# Patient Record
Sex: FEMALE | URBAN - METROPOLITAN AREA
[De-identification: names, ages, dates, MRNs, and addresses within clinical notes are randomized per-mention and may not be internally consistent; named-entity substitution may affect disease eponyms.]

---

## 2021-04-07 ENCOUNTER — HOSPITAL ENCOUNTER (EMERGENCY)
Facility: HOSPITAL | Age: 51
Discharge: STILL ADMITTED OR ADMINISTRATIVE | End: 2021-04-07

## 2021-04-07 RX ORDER — HYDROCODONE BITARTRATE AND ACETAMINOPHEN 5; 325 MG/1; MG/1
1 TABLET ORAL EVERY 6 HOURS PRN
COMMUNITY
End: 2021-04-07 | Stop reason: HOSPADM

## 2023-04-23 ENCOUNTER — APPOINTMENT (OUTPATIENT)
Dept: GENERAL RADIOLOGY | Facility: HOSPITAL | Age: 53
End: 2023-04-23
Payer: MEDICARE

## 2023-04-23 ENCOUNTER — HOSPITAL ENCOUNTER (EMERGENCY)
Facility: HOSPITAL | Age: 53
Discharge: HOME | End: 2023-04-23
Attending: EMERGENCY MEDICINE | Admitting: EMERGENCY MEDICINE
Payer: MEDICARE

## 2023-04-23 VITALS
DIASTOLIC BLOOD PRESSURE: 91 MMHG | HEIGHT: 67 IN | OXYGEN SATURATION: 100 % | TEMPERATURE: 97.9 F | WEIGHT: 293 LBS | BODY MASS INDEX: 45.99 KG/M2 | RESPIRATION RATE: 20 BRPM | HEART RATE: 64 BPM | SYSTOLIC BLOOD PRESSURE: 152 MMHG

## 2023-04-23 DIAGNOSIS — M94.0 COSTOCHONDRITIS, ACUTE: Primary | ICD-10-CM

## 2023-04-23 LAB
ALBUMIN SERPL-MCNC: 3.8 G/DL (ref 3.5–5.2)
ALBUMIN/GLOB SERPL: 1 G/DL
ALP SERPL-CCNC: 94 U/L (ref 39–117)
ALT SERPL W P-5'-P-CCNC: 25 U/L (ref 1–33)
ANION GAP SERPL CALCULATED.3IONS-SCNC: 10.4 MMOL/L (ref 5–15)
AST SERPL-CCNC: 27 U/L (ref 1–32)
BASOPHILS # BLD AUTO: 0.06 10*3/MM3 (ref 0–0.2)
BASOPHILS NFR BLD AUTO: 1 % (ref 0–1.5)
BILIRUB SERPL-MCNC: 0.3 MG/DL (ref 0–1.2)
BUN SERPL-MCNC: 9 MG/DL (ref 6–20)
BUN/CREAT SERPL: 12.5 (ref 7–25)
CALCIUM SPEC-SCNC: 8.9 MG/DL (ref 8.6–10.5)
CHLORIDE SERPL-SCNC: 103 MMOL/L (ref 98–107)
CO2 SERPL-SCNC: 25.6 MMOL/L (ref 22–29)
CREAT SERPL-MCNC: 0.72 MG/DL (ref 0.57–1)
DEPRECATED RDW RBC AUTO: 45.5 FL (ref 37–54)
EGFRCR SERPLBLD CKD-EPI 2021: 102 ML/MIN/1.73
EOSINOPHIL # BLD AUTO: 0.11 10*3/MM3 (ref 0–0.4)
EOSINOPHIL NFR BLD AUTO: 1.8 % (ref 0.3–6.2)
ERYTHROCYTE [DISTWIDTH] IN BLOOD BY AUTOMATED COUNT: 15.1 % (ref 12.3–15.4)
GEN 5 2HR TROPONIN T REFLEX: 11 NG/L
GLOBULIN UR ELPH-MCNC: 3.8 GM/DL
GLUCOSE SERPL-MCNC: 99 MG/DL (ref 65–99)
HCT VFR BLD AUTO: 38.5 % (ref 34–46.6)
HGB BLD-MCNC: 12.2 G/DL (ref 12–15.9)
HOLD SPECIMEN: NORMAL
HOLD SPECIMEN: NORMAL
IMM GRANULOCYTES # BLD AUTO: 0.02 10*3/MM3 (ref 0–0.05)
IMM GRANULOCYTES NFR BLD AUTO: 0.3 % (ref 0–0.5)
LIPASE SERPL-CCNC: 27 U/L (ref 13–60)
LYMPHOCYTES # BLD AUTO: 1.67 10*3/MM3 (ref 0.7–3.1)
LYMPHOCYTES NFR BLD AUTO: 26.8 % (ref 19.6–45.3)
MAGNESIUM SERPL-MCNC: 1.9 MG/DL (ref 1.6–2.6)
MCH RBC QN AUTO: 26.6 PG (ref 26.6–33)
MCHC RBC AUTO-ENTMCNC: 31.7 G/DL (ref 31.5–35.7)
MCV RBC AUTO: 84.1 FL (ref 79–97)
MONOCYTES # BLD AUTO: 0.47 10*3/MM3 (ref 0.1–0.9)
MONOCYTES NFR BLD AUTO: 7.5 % (ref 5–12)
NEUTROPHILS NFR BLD AUTO: 3.9 10*3/MM3 (ref 1.7–7)
NEUTROPHILS NFR BLD AUTO: 62.6 % (ref 42.7–76)
NRBC BLD AUTO-RTO: 0 /100 WBC (ref 0–0.2)
NT-PROBNP SERPL-MCNC: 169.9 PG/ML (ref 0–900)
PLATELET # BLD AUTO: 354 10*3/MM3 (ref 140–450)
PMV BLD AUTO: 10.1 FL (ref 6–12)
POTASSIUM SERPL-SCNC: 4.1 MMOL/L (ref 3.5–5.2)
PROT SERPL-MCNC: 7.6 G/DL (ref 6–8.5)
RBC # BLD AUTO: 4.58 10*6/MM3 (ref 3.77–5.28)
SODIUM SERPL-SCNC: 139 MMOL/L (ref 136–145)
TROPONIN T DELTA: 3 NG/L
TROPONIN T SERPL HS-MCNC: 8 NG/L
WBC NRBC COR # BLD: 6.23 10*3/MM3 (ref 3.4–10.8)
WHOLE BLOOD HOLD COAG: NORMAL
WHOLE BLOOD HOLD SPECIMEN: NORMAL

## 2023-04-23 PROCEDURE — 84484 ASSAY OF TROPONIN QUANT: CPT | Performed by: EMERGENCY MEDICINE

## 2023-04-23 PROCEDURE — 71045 X-RAY EXAM CHEST 1 VIEW: CPT

## 2023-04-23 PROCEDURE — 36415 COLL VENOUS BLD VENIPUNCTURE: CPT

## 2023-04-23 PROCEDURE — 80053 COMPREHEN METABOLIC PANEL: CPT | Performed by: EMERGENCY MEDICINE

## 2023-04-23 PROCEDURE — 83690 ASSAY OF LIPASE: CPT | Performed by: EMERGENCY MEDICINE

## 2023-04-23 PROCEDURE — 93005 ELECTROCARDIOGRAM TRACING: CPT | Performed by: EMERGENCY MEDICINE

## 2023-04-23 PROCEDURE — 99283 EMERGENCY DEPT VISIT LOW MDM: CPT

## 2023-04-23 PROCEDURE — 83735 ASSAY OF MAGNESIUM: CPT | Performed by: EMERGENCY MEDICINE

## 2023-04-23 PROCEDURE — 85025 COMPLETE CBC W/AUTO DIFF WBC: CPT | Performed by: EMERGENCY MEDICINE

## 2023-04-23 PROCEDURE — 83880 ASSAY OF NATRIURETIC PEPTIDE: CPT | Performed by: EMERGENCY MEDICINE

## 2023-04-23 RX ORDER — SODIUM CHLORIDE 0.9 % (FLUSH) 0.9 %
10 SYRINGE (ML) INJECTION AS NEEDED
Status: DISCONTINUED | OUTPATIENT
Start: 2023-04-23 | End: 2023-04-23 | Stop reason: HOSPADM

## 2023-04-23 RX ORDER — ASPIRIN 81 MG/1
324 TABLET, CHEWABLE ORAL ONCE
Status: DISCONTINUED | OUTPATIENT
Start: 2023-04-23 | End: 2023-04-23 | Stop reason: HOSPADM

## 2023-04-23 NOTE — ED PROVIDER NOTES
Time: 12:31 PM EDT  Date of encounter:  4/23/2023  Independent Historian/Clinical History and Information was obtained by:   Patient  Chief Complaint: Chest pain    History is limited by: N/A    History of Present Illness:  Patient is a 52 y.o. year old female who presents to the emergency department for evaluation of chest pain. Pt reports left sternal and mid scapula chest pain radiating to back and left shoulder. Pt reports the pain started 1030 to 1045 this morning. Pt reports the pain feels like an elephant sitting on her chest. Pt did not have pain last night. Pt reports she did have diaphoresis through the night. Pt reports breathing and movement makes the pain worse. Pt reports resting makes the pain better. Pt reports SOB due to the pain.  Pt denies fever. Pt reports pain induced coughing. Pt has a history of A-fib. Pt states she took 3 nitro prior to arrival without improvement of her pain.    HPI    Patient Care Team  Primary Care Provider: Georgiana Rey MD    Past Medical History:     Allergies   Allergen Reactions   • Bee Venom Anaphylaxis   • Neeta Anaphylaxis   • Iodinated Contrast Media Anaphylaxis and Itching   • Iodine Anaphylaxis   • Meloxicam Shortness Of Breath and Swelling   • Red Dye Shortness Of Breath   • Tartrazine Anaphylaxis   • Methocarbamol Nausea And Vomiting   • Oxycodone-Acetaminophen Itching and Swelling   • Famotidine Other (See Comments)     Pt experienced burning in vein and redness of arm above IV site   • Sumatriptan Swelling   • Tramadol Hcl Other (See Comments)     ULCERS IN MOUTH   • Adhesive Tape Rash     Past Medical History:   Diagnosis Date   • A-fib    • Asthma    • Diabetes mellitus    • Hypertension    • Lymphedema    • Migraine    • Murmur    • Sleep apnea      Past Surgical History:   Procedure Laterality Date   • CHOLECYSTECTOMY     • FOOT SURGERY     • HYSTERECTOMY     • INCONTINENCE SURGERY       History reviewed. No pertinent family history.    Home  "Medications:  Prior to Admission medications    Medication Sig Start Date End Date Taking? Authorizing Provider   diphenhydrAMINE (Benadryl Allergy) 25 MG tablet Take 1 tablet by mouth Every 6 (Six) Hours As Needed for Itching or Allergies (Take 4 times daily for next 4 days then as needed) for up to 30 doses. 4/7/21   Geremias Flores MD   esomeprazole (nexIUM) 40 MG capsule Take 40 mg by mouth Every Morning Before Breakfast.    ProviderErnestine MD   HYDROcodone-acetaminophen (NORCO) 7.5-325 MG per tablet Take 1 tablet by mouth Every 6 (Six) Hours As Needed for Moderate Pain .    Ernestine Jackson MD   metFORMIN ER (GLUCOPHAGE-XR) 500 MG 24 hr tablet Take 500 mg by mouth Daily With Breakfast.    ProviderErnestine MD   predniSONE (DELTASONE) 10 MG tablet 6 tabs by mouth day 1, 5 tabs by mouth day 2, continue tapering one tablet daily: Coarse 6 days. 4/7/21   Geremias Flores MD        Social History:   Social History     Tobacco Use   • Smoking status: Never   Vaping Use   • Vaping Use: Never used   Substance Use Topics   • Alcohol use: Never   • Drug use: Never         Review of Systems:  Review of Systems   Constitutional: Positive for diaphoresis. Negative for chills and fever.   HENT: Negative for congestion, ear pain and sore throat.    Eyes: Negative for pain.   Respiratory: Positive for cough and shortness of breath. Negative for chest tightness.    Cardiovascular: Positive for chest pain.   Gastrointestinal: Negative for abdominal pain, diarrhea, nausea and vomiting.   Genitourinary: Negative for flank pain and hematuria.   Musculoskeletal: Negative for joint swelling.   Skin: Negative for pallor.   Neurological: Negative for seizures and headaches.   All other systems reviewed and are negative.           Physical Exam:  /91   Pulse 64   Temp 97.9 °F (36.6 °C) (Oral)   Resp 20   Ht 170.2 cm (67\")   Wt (!) 181 kg (398 lb 2.4 oz)   SpO2 100%   BMI 62.36 kg/m²     Physical " Exam  Vitals and nursing note reviewed.   Constitutional:       General: She is not in acute distress.     Appearance: Normal appearance. She is morbidly obese. She is not toxic-appearing.   HENT:      Head: Normocephalic and atraumatic.      Mouth/Throat:      Mouth: Mucous membranes are moist.   Eyes:      General: No scleral icterus.  Cardiovascular:      Rate and Rhythm: Normal rate and regular rhythm.      Pulses: Normal pulses.      Heart sounds: Normal heart sounds.   Pulmonary:      Effort: Pulmonary effort is normal. No respiratory distress.      Breath sounds: Normal breath sounds.   Chest:      Chest wall: Tenderness (Pt does have reproducible tenderness of the left sternal border. ) present.   Abdominal:      General: Abdomen is flat.      Palpations: Abdomen is soft.      Tenderness: There is no abdominal tenderness.   Musculoskeletal:         General: Normal range of motion.      Cervical back: Normal range of motion and neck supple.      Thoracic back: Tenderness (Reproducible tenderness along the left middle parathoracic muscle area.) present.   Skin:     General: Skin is warm and dry.   Neurological:      Mental Status: She is alert and oriented to person, place, and time. Mental status is at baseline.                    Procedures:  Procedures      Medical Decision Making:      Comorbidities that affect care:    Heart Murmur, Hypertension, Sleep Apnea, Diabetes, Asthma, Atrial Fibrillation, morbid obesity.    External Notes reviewed:    Previous Clinic Note: Chart review shows no recent charts to review.  Her last office visit that could be reviewed and was reviewed by me was on 7/11/2014.      The following orders were placed and all results were independently analyzed by me:  Orders Placed This Encounter   Procedures   • XR Chest 1 View   • [place] Draw   • High Sensitivity Troponin T   • Comprehensive Metabolic Panel   • Lipase   • BNP   • Magnesium   • CBC Auto Differential   • High Sensitivity  Troponin T 2Hr   • NPO Diet NPO Type: Strict NPO   • Undress & Gown   • Cardiac Monitoring   • Continuous Pulse Oximetry   • Oxygen Therapy- Nasal Cannula; 2 LPM; Titrate for SPO2: equal to or greater than, 92%   • ECG 12 Lead ED Triage Standing Order; Chest Pain   • ECG 12 Lead ED Triage Standing Order; Chest Pain   • Insert Peripheral IV   • CBC & Differential   • Green Top (Gel)   • Lavender Top   • Gold Top - SST   • Light Blue Top       Medications Given in the Emergency Department:  Medications   sodium chloride 0.9 % flush 10 mL (has no administration in time range)   aspirin chewable tablet 324 mg (324 mg Oral Not Given 4/23/23 1200)        ED Course:    ED Course as of 04/23/23 1546   Sun Apr 23, 2023   1540 EKG performed at 1158 was interpreted by me to show normal sinus rhythm with a ventricular rate of 69 bpm.  The parable is 174 ms.  P waves are normal.  QRS interval is normal.  Axis XIX degrees.  There is no acute ischemic ST or T wave change identified.  QT corrected was 439 ms [TB]      ED Course User Index  [TB] Hugh Short DO     The patient was seen and evaluated the ED by me.  The above history and physical examination was performed as document.  Diagnostic data was obtained.  Results reviewed.  Discussed with the patient.  Patient's cardiac and pulmonary work-ups were unremarkable.  Patient's pain was reproducible and consistent with her symptoms of a more of a musculoskeletal etiology.  Patient reports that she has hydrocodone at home.  Patient states she does not need any kind of pain medicine from me.  Subsequently, patient for discharge home with outpatient treatment follow-up.  Patient also reports she has a primary care follow-up this week as well.      Labs:    Lab Results (last 24 hours)     Procedure Component Value Units Date/Time    High Sensitivity Troponin T [[id number]]  (Normal) Collected: 04/23/23 1210    Specimen: Blood Updated: 04/23/23 1306     HS Troponin T 8 ng/L      Narrative:      High Sensitive Troponin T Reference Range:  <10.0 ng/L- Negative Female for AMI  <15.0 ng/L- Negative Male for AMI  >=10 - Abnormal Female indicating possible myocardial injury.  >=15 - Abnormal Male indicating possible myocardial injury.   Clinicians would have to utilize clinical acumen, EKG, Troponin, and serial changes to determine if it is an Acute Myocardial Infarction or myocardial injury due to an underlying chronic condition.         CBC & Differential [[id number]]  (Normal) Collected: 04/23/23 1210    Specimen: Blood Updated: 04/23/23 1244    Narrative:      The following orders were created for panel order CBC & Differential.  Procedure                               Abnormality         Status                     ---------                               -----------         ------                     CBC Auto Differential[[id number]]        Normal              Final result                 Please view results for these tests on the individual orders.    Comprehensive Metabolic Panel [[id number]] Collected: 04/23/23 1210    Specimen: Blood Updated: 04/23/23 1306     Glucose 99 mg/dL      BUN 9 mg/dL      Creatinine 0.72 mg/dL      Sodium 139 mmol/L      Potassium 4.1 mmol/L      Chloride 103 mmol/L      CO2 25.6 mmol/L      Calcium 8.9 mg/dL      Total Protein 7.6 g/dL      Albumin 3.8 g/dL      ALT (SGPT) 25 U/L      AST (SGOT) 27 U/L      Alkaline Phosphatase 94 U/L      Total Bilirubin 0.3 mg/dL      Globulin 3.8 gm/dL      A/G Ratio 1.0 g/dL      BUN/Creatinine Ratio 12.5     Anion Gap 10.4 mmol/L      eGFR 102.0 mL/min/1.73     Narrative:      GFR Normal >60  Chronic Kidney Disease <60  Kidney Failure <15      Lipase [[id number]]  (Normal) Collected: 04/23/23 1210    Specimen: Blood Updated: 04/23/23 1306     Lipase 27 U/L     BNP [[id number]]  (Normal) Collected: 04/23/23 1210    Specimen: Blood Updated: 04/23/23 1302     proBNP 169.9 pg/mL     Narrative:      Among patients with dyspnea,  NT-proBNP is highly sensitive for the detection of acute congestive heart failure. In addition NT-proBNP of <300 pg/ml effectively rules out acute congestive heart failure with 99% negative predictive value.      Magnesium [[id number]]  (Normal) Collected: 04/23/23 1210    Specimen: Blood Updated: 04/23/23 1306     Magnesium 1.9 mg/dL     CBC Auto Differential [[id number]]  (Normal) Collected: 04/23/23 1210    Specimen: Blood Updated: 04/23/23 1244     WBC 6.23 10*3/mm3      RBC 4.58 10*6/mm3      Hemoglobin 12.2 g/dL      Hematocrit 38.5 %      MCV 84.1 fL      MCH 26.6 pg      MCHC 31.7 g/dL      RDW 15.1 %      RDW-SD 45.5 fl      MPV 10.1 fL      Platelets 354 10*3/mm3      Neutrophil % 62.6 %      Lymphocyte % 26.8 %      Monocyte % 7.5 %      Eosinophil % 1.8 %      Basophil % 1.0 %      Immature Grans % 0.3 %      Neutrophils, Absolute 3.90 10*3/mm3      Lymphocytes, Absolute 1.67 10*3/mm3      Monocytes, Absolute 0.47 10*3/mm3      Eosinophils, Absolute 0.11 10*3/mm3      Basophils, Absolute 0.06 10*3/mm3      Immature Grans, Absolute 0.02 10*3/mm3      nRBC 0.0 /100 WBC     High Sensitivity Troponin T 2Hr [[id number]]  (Abnormal) Collected: 04/23/23 1435    Specimen: Blood Updated: 04/23/23 1501     HS Troponin T 11 ng/L      Troponin T Delta 3 ng/L     Narrative:      High Sensitive Troponin T Reference Range:  <10.0 ng/L- Negative Female for AMI  <15.0 ng/L- Negative Male for AMI  >=10 - Abnormal Female indicating possible myocardial injury.  >=15 - Abnormal Male indicating possible myocardial injury.   Clinicians would have to utilize clinical acumen, EKG, Troponin, and serial changes to determine if it is an Acute Myocardial Infarction or myocardial injury due to an underlying chronic condition.                Imaging:    XR Chest 1 View    Result Date: 4/23/2023  PROCEDURE: XR CHEST 1 VW  COMPARISON: None  INDICATIONS: Chest Pain Triage Protocol  FINDINGS:  There is no pneumothorax, pleural effusion or  focal airspace consolidation. The heart size and pulmonary vasculature appear within normal limits. There are no acute osseous abnormalities.       No acute cardiopulmonary abnormality.       GLEN GALLEGOS MD       Electronically Signed and Approved By: GLEN GALLEGOS MD on 4/23/2023 at 12:19                 Differential Diagnosis and Discussion:    Chest Pain:  Based on the patient's signs and symptoms, I considered aortic dissection, myocardial infaction, pulmonary embolism, cardiac tamponade, pericarditis, pneumothorax, musculoskeletal chest pain and other differential diagnosis as an etiology of the patient's chest pain.   Dyspnea: Differential diagnosis includes but is not limited to metabolic acidosis, neurological disorders, psychogenic, asthma, pneumothorax, upper airway obstruction, COPD, pneumonia, noncardiogenic pulmonary edema, interstitial lung disease, anemia, congestive heart failure, and pulmonary embolism    All labs were reviewed and interpreted by me.  All X-rays impressions were independently interpreted by me.  EKG was interpreted by me.    MDM         Patient Care Considerations:    ANTIBIOTICS: I considered prescribing antibiotics as an outpatient however There is no evidence of an acute bacterial infection.      Consultants/Shared Management Plan:    None    Social Determinants of Health:    Patient is independent, reliable, and has access to care.       Disposition and Care Coordination:    Discharged: I considered escalation of care by admitting this patient for observation, however the patient has improved and is suitable and  stable for discharge.    I have explained the patient´s condition, diagnoses and treatment plan based on the information available to me at this time. I have answered questions and addressed any concerns. The patient has a good  understanding of the patient´s diagnosis, condition, and treatment plan as can be expected at this point. The vital signs have been stable.  The patient´s condition is stable and appropriate for discharge from the emergency department.      The patient will pursue further outpatient evaluation with the primary care physician or other designated or consulting physician as outlined in the discharge instructions. They are agreeable to this plan of care and follow-up instructions have been explained in detail. The patient has received these instructions in written format and have expressed an understanding of the discharge instructions. The patient is aware that any significant change in condition or worsening of symptoms should prompt an immediate return to this or the closest emergency department or call to 911.       Medication List      No changes were made to your prescriptions during this visit.      Georgiana Rey MD  [id number] [address]  [phone]      Follow-up this week as scheduled.       Final diagnoses:   Costochondritis, acute        ED Disposition     ED Disposition   Discharge    Condition   Stable    Comment   --             This medical record created using voice recognition software.        Documentation assistance provided by Som Reyna acting as scribe for Hugh Short DO. Information recorded by the scribe was done at my direction and has been verified and validated by me.          Som Reyna  04/23/23 1250       Hugh Short DO  04/23/23 9929

## 2023-04-23 NOTE — ED NOTES
Pt arrived via stretcher and EMS, was placed in a bed and triage began, patient stated the need to urinate and ambulated to the bathroom. EKG was delayed R/T this

## 2023-04-23 NOTE — DISCHARGE INSTRUCTIONS
Avoid any strenuous activities.  Continue with your current home medications as previously instructed.  Follow-up your family doctor this week as scheduled.  Return to the ER for any change or worsening symptoms or any other concerns that may arise.

## 2023-04-27 LAB — QT INTERVAL: 410 MS

## 2023-08-09 ENCOUNTER — TRANSCRIBE ORDERS (OUTPATIENT)
Dept: ADMINISTRATIVE | Facility: HOSPITAL | Age: 53
End: 2023-08-09
Payer: MEDICARE

## 2023-08-09 DIAGNOSIS — M79.89 SWELLING OF ARM: Primary | ICD-10-CM

## 2023-08-11 ENCOUNTER — HOSPITAL ENCOUNTER (OUTPATIENT)
Dept: CARDIOLOGY | Facility: HOSPITAL | Age: 53
Discharge: HOME | End: 2023-08-11
Payer: MEDICARE

## 2023-08-11 DIAGNOSIS — M79.89 SWELLING OF ARM: ICD-10-CM

## 2023-08-11 LAB
BH CV UPPER VENOUS LEFT AXILLARY AUGMENT: NORMAL
BH CV UPPER VENOUS LEFT AXILLARY COMPRESS: NORMAL
BH CV UPPER VENOUS LEFT AXILLARY PHASIC: NORMAL
BH CV UPPER VENOUS LEFT AXILLARY SPONT: NORMAL
BH CV UPPER VENOUS LEFT BASILIC FOREARM COMPRESS: NORMAL
BH CV UPPER VENOUS LEFT BASILIC UPPER COMPRESS: NORMAL
BH CV UPPER VENOUS LEFT BRACHIAL COMPRESS: NORMAL
BH CV UPPER VENOUS LEFT CEPHALIC FOREARM COMPRESS: NORMAL
BH CV UPPER VENOUS LEFT CEPHALIC UPPER COMPRESS: NORMAL
BH CV UPPER VENOUS LEFT INTERNAL JUGULAR AUGMENT: NORMAL
BH CV UPPER VENOUS LEFT INTERNAL JUGULAR COMPRESS: NORMAL
BH CV UPPER VENOUS LEFT INTERNAL JUGULAR PHASIC: NORMAL
BH CV UPPER VENOUS LEFT INTERNAL JUGULAR SPONT: NORMAL
BH CV UPPER VENOUS LEFT RADIAL COMPRESS: NORMAL
BH CV UPPER VENOUS LEFT SUBCLAVIAN AUGMENT: NORMAL
BH CV UPPER VENOUS LEFT SUBCLAVIAN COMPRESS: NORMAL
BH CV UPPER VENOUS LEFT SUBCLAVIAN PHASIC: NORMAL
BH CV UPPER VENOUS LEFT SUBCLAVIAN SPONT: NORMAL
BH CV UPPER VENOUS LEFT ULNAR COMPRESS: NORMAL
BH CV UPPER VENOUS RIGHT INTERNAL JUGULAR AUGMENT: NORMAL
BH CV UPPER VENOUS RIGHT INTERNAL JUGULAR COMPRESS: NORMAL
BH CV UPPER VENOUS RIGHT INTERNAL JUGULAR PHASIC: NORMAL
BH CV UPPER VENOUS RIGHT INTERNAL JUGULAR SPONT: NORMAL
BH CV UPPER VENOUS RIGHT SUBCLAVIAN AUGMENT: NORMAL
BH CV UPPER VENOUS RIGHT SUBCLAVIAN COMPRESS: NORMAL
BH CV UPPER VENOUS RIGHT SUBCLAVIAN PHASIC: NORMAL
BH CV UPPER VENOUS RIGHT SUBCLAVIAN SPONT: NORMAL

## 2023-08-11 PROCEDURE — 93971 EXTREMITY STUDY: CPT

## 2023-09-26 ENCOUNTER — OFFICE VISIT (OUTPATIENT)
Dept: ORTHOPEDIC SURGERY | Facility: CLINIC | Age: 53
End: 2023-09-26
Payer: MEDICARE

## 2023-09-26 VITALS — WEIGHT: 293 LBS | OXYGEN SATURATION: 97 % | HEIGHT: 67 IN | HEART RATE: 71 BPM | BODY MASS INDEX: 45.99 KG/M2

## 2023-09-26 DIAGNOSIS — M25.562 LEFT KNEE PAIN, UNSPECIFIED CHRONICITY: ICD-10-CM

## 2023-09-26 DIAGNOSIS — M25.561 RIGHT KNEE PAIN, UNSPECIFIED CHRONICITY: Primary | ICD-10-CM

## 2023-09-26 DIAGNOSIS — M17.0 BILATERAL PRIMARY OSTEOARTHRITIS OF KNEE: ICD-10-CM

## 2023-09-26 RX ORDER — LIDOCAINE HYDROCHLORIDE 10 MG/ML
5 INJECTION, SOLUTION INFILTRATION; PERINEURAL
Status: COMPLETED | OUTPATIENT
Start: 2023-09-26 | End: 2023-09-26

## 2023-09-26 RX ORDER — DICLOFENAC SODIUM 75 MG/1
75 TABLET, DELAYED RELEASE ORAL 2 TIMES DAILY
Qty: 60 TABLET | Refills: 2 | Status: SHIPPED | OUTPATIENT
Start: 2023-09-26

## 2023-09-26 RX ORDER — TRIAMCINOLONE ACETONIDE 40 MG/ML
40 INJECTION, SUSPENSION INTRA-ARTICULAR; INTRAMUSCULAR
Status: COMPLETED | OUTPATIENT
Start: 2023-09-26 | End: 2023-09-26

## 2023-09-26 RX ADMIN — LIDOCAINE HYDROCHLORIDE 5 ML: 10 INJECTION, SOLUTION INFILTRATION; PERINEURAL at 10:54

## 2023-09-26 RX ADMIN — LIDOCAINE HYDROCHLORIDE 5 ML: 10 INJECTION, SOLUTION INFILTRATION; PERINEURAL at 10:53

## 2023-09-26 RX ADMIN — TRIAMCINOLONE ACETONIDE 40 MG: 40 INJECTION, SUSPENSION INTRA-ARTICULAR; INTRAMUSCULAR at 10:53

## 2023-09-26 RX ADMIN — TRIAMCINOLONE ACETONIDE 40 MG: 40 INJECTION, SUSPENSION INTRA-ARTICULAR; INTRAMUSCULAR at 10:54

## 2023-09-26 NOTE — PROGRESS NOTES
"Chief Complaint  Initial Evaluation and Pain of the Left Knee and Pain and Initial Evaluation of the Right Knee     Subjective      Mina Rivera presents to [facility] for evaluation of the bilateral knees. The patient reports bilateral knee pain. She reports she has had multiple injured. She takes Norco. She reports swelling and lipoidemia .  She reports her left is worse than the right. She reports pain with stairs.     Allergies   Allergen Reactions    Bee Venom Anaphylaxis    Neeta Anaphylaxis    Iodinated Contrast Media Anaphylaxis and Itching    Iodine Anaphylaxis    Meloxicam Shortness Of Breath and Swelling    Red Dye Shortness Of Breath    Tartrazine Anaphylaxis    Methocarbamol Nausea And Vomiting    Oxycodone-Acetaminophen Itching and Swelling    Famotidine Other (See Comments)     Pt experienced burning in vein and redness of arm above IV site    Sumatriptan Swelling    Tramadol Hcl Other (See Comments)     ULCERS IN MOUTH    Adhesive Tape Rash        Social History     Socioeconomic History    Marital status: Single   Tobacco Use    Smoking status: Never   Vaping Use    Vaping Use: Never used   Substance and Sexual Activity    Alcohol use: Never    Drug use: Never        I reviewed the patient's chief complaint, history of present illness, review of systems, past medical history, surgical history, family history, social history, medications, and allergy list.     Review of Systems     Constitutional: Denies fevers, chills, weight loss  Cardiovascular: Denies chest pain, shortness of breath  Skin: Denies rashes, acute skin changes  Neurologic: Denies headache, loss of consciousness  MSK: bilateral knee pain      Vital Signs:   Pulse 71   Ht 170.2 cm (67\")   Wt (!) 181 kg (400 lb)   SpO2 97%   BMI 62.65 kg/m²          Physical Exam  General: Alert. No acute distress    Ortho Exam      Right knee- well healed scope scars. ROM -5 to 100 degrees. Medial joint line " tenderness. Stable to varus/valgus stress. Stable to anterior/posterior drawer. Negative Lachman's. Negative Silas's. Positive EHL, FHL, GS and TA. Sensation intact to all 5 nerves of the foot. Positive pulses.     Left knee- ROM -5 to 105 degrees. Medial joint line tenderness. Stable to varus/valgus stress. Stable to anterior/posterior drawer. Negative Lachman's. Negative Silas's. Positive EHL, FHL, GS and TA. Sensation intact to all 5 nerves of the foot. Positive pulses.     Right knee: R knee  Date/Time: 9/26/2023 10:53 AM  Consent given by: patient  Site marked: site marked  Timeout: Immediately prior to procedure a time out was called to verify the correct patient, procedure, equipment, support staff and site/side marked as required   Supporting Documentation  Indications: pain   Procedure Details  Location: knee - R knee  Needle gauge: 21G.  Medications administered: 5 mL lidocaine 1 %; 40 mg triamcinolone acetonide 40 MG/ML  Patient tolerance: patient tolerated the procedure well with no immediate complications      Left knee: L knee  Date/Time: 9/26/2023 10:54 AM  Consent given by: patient  Site marked: site marked  Timeout: Immediately prior to procedure a time out was called to verify the correct patient, procedure, equipment, support staff and site/side marked as required   Supporting Documentation  Indications: pain   Procedure Details  Location: knee - L knee  Needle gauge: 21G.  Medications administered: 5 mL lidocaine 1 %; 40 mg triamcinolone acetonide 40 MG/ML  Patient tolerance: patient tolerated the procedure well with no immediate complications        X-Ray Report:  Left knee X-Ray  Indication: Evaluation of left knee pain  AP/Lateral, Standing, and [place] view(s)  Findings: tricompartmental  advanced degenerative changes. Near bone on bone to the medial compartment. No acute fracture.   Prior studies available for comparison: no     X-Ray Report:  Right knee X-Ray  Indication: Evaluation  of right knee pain  AP/Lateral, Standing, and [place] view(s)  Findings: tricompartmental  advanced degenerative changes. Near bone on bone to the medial compartment. No acute fracture.   Prior studies available for comparison: no       Imaging Results (Most Recent)       Procedure Component Value Units Date/Time    XR Knee 3 View Right [[id number]] Resulted: 09/26/23 1016     Updated: 09/26/23 1018    XR Knee 3 View Left [[id number]] Resulted: 09/26/23 1016     Updated: 09/26/23 1018             Result Review :       No results found.           Assessment and Plan     Diagnoses and all orders for this visit:    1. Right knee pain, unspecified chronicity (Primary)  -     XR Knee 3 View Right    2. Left knee pain, unspecified chronicity  -     XR Knee 3 View Left    3. Bilateral primary osteoarthritis of knee        Discussed the treatment plan with the patient.  I reviewed the x-rays that were obtained today with the patient. Plan for conservative treatment at this time. Prescription for diclofenac given today. Discussed the risks and benefits of bilateral knee steroid injection. The patient expressed understanding and wished to proceed. She tolerated the injection.     Educated on risk of elevated BMI.  Discussed options for weight loss/decreasing BMI prior to procedure including dietician consult, weight loss options and exercise program. and Call or return if worsening symptoms.    Follow Up     6 weeks      Patient was given instructions and counseling regarding her condition or for health maintenance advice. Please see specific information pulled into the AVS if appropriate.     Scribed for Ben Herzog MD by Vicki López.  09/26/23   10:13 EDT    I have personally performed the services described in this document as scribed by the above individual and it is both accurate and complete. Ben Herzog MD 09/26/23

## 2023-11-25 DIAGNOSIS — M25.562 LEFT KNEE PAIN, UNSPECIFIED CHRONICITY: ICD-10-CM

## 2023-11-25 DIAGNOSIS — M25.561 RIGHT KNEE PAIN, UNSPECIFIED CHRONICITY: ICD-10-CM

## 2023-11-25 DIAGNOSIS — M17.0 BILATERAL PRIMARY OSTEOARTHRITIS OF KNEE: ICD-10-CM

## 2023-11-27 RX ORDER — DICLOFENAC SODIUM 75 MG/1
75 TABLET, DELAYED RELEASE ORAL 2 TIMES DAILY
Qty: 60 TABLET | Refills: 2 | Status: SHIPPED | OUTPATIENT
Start: 2023-11-27

## 2023-12-28 ENCOUNTER — OFFICE VISIT (OUTPATIENT)
Dept: ORTHOPEDIC SURGERY | Facility: CLINIC | Age: 53
End: 2023-12-28
Payer: MEDICARE

## 2023-12-28 VITALS
WEIGHT: 293 LBS | DIASTOLIC BLOOD PRESSURE: 93 MMHG | HEART RATE: 85 BPM | HEIGHT: 67 IN | OXYGEN SATURATION: 97 % | SYSTOLIC BLOOD PRESSURE: 180 MMHG | BODY MASS INDEX: 45.99 KG/M2

## 2023-12-28 DIAGNOSIS — M17.0 BILATERAL PRIMARY OSTEOARTHRITIS OF KNEE: ICD-10-CM

## 2023-12-28 DIAGNOSIS — M25.561 RIGHT KNEE PAIN, UNSPECIFIED CHRONICITY: Primary | ICD-10-CM

## 2023-12-28 DIAGNOSIS — M25.562 LEFT KNEE PAIN, UNSPECIFIED CHRONICITY: ICD-10-CM

## 2023-12-28 RX ORDER — ALBUTEROL SULFATE 90 UG/1
AEROSOL, METERED RESPIRATORY (INHALATION)
COMMUNITY

## 2023-12-28 RX ORDER — TRIAMCINOLONE ACETONIDE 40 MG/ML
40 INJECTION, SUSPENSION INTRA-ARTICULAR; INTRAMUSCULAR
Status: COMPLETED | OUTPATIENT
Start: 2023-12-28 | End: 2023-12-28

## 2023-12-28 RX ORDER — HYDROCODONE BITARTRATE AND ACETAMINOPHEN 5; 325 MG/1; MG/1
1 TABLET ORAL EVERY 6 HOURS
COMMUNITY
Start: 2023-12-05

## 2023-12-28 RX ORDER — LIDOCAINE HYDROCHLORIDE 10 MG/ML
5 INJECTION, SOLUTION INFILTRATION; PERINEURAL
Status: COMPLETED | OUTPATIENT
Start: 2023-12-28 | End: 2023-12-28

## 2023-12-28 RX ORDER — CETIRIZINE HYDROCHLORIDE 10 MG/1
10 TABLET ORAL DAILY
COMMUNITY
Start: 2023-08-15 | End: 2024-08-14

## 2023-12-28 RX ORDER — BLOOD-GLUCOSE METER
KIT MISCELLANEOUS DAILY
COMMUNITY
Start: 2023-09-19

## 2023-12-28 RX ORDER — VORTIOXETINE 20 MG/1
1 TABLET, FILM COATED ORAL DAILY
COMMUNITY

## 2023-12-28 RX ADMIN — TRIAMCINOLONE ACETONIDE 40 MG: 40 INJECTION, SUSPENSION INTRA-ARTICULAR; INTRAMUSCULAR at 13:02

## 2023-12-28 RX ADMIN — LIDOCAINE HYDROCHLORIDE 5 ML: 10 INJECTION, SOLUTION INFILTRATION; PERINEURAL at 13:02

## 2023-12-28 NOTE — PROGRESS NOTES
Chief Complaint  Pain and Follow-up of the Left Knee and Pain and Follow-up of the Right Knee    Subjective          Pain        Mina Rivera is a 53 y.o. female  presents to [facility] for     Patient presents for follow-up evaluation of bilateral knee pain, bilateral knee osteoarthritis.  She saw Dr. Herzog originally on 9/26/2023 for initial evaluation he gave her steroid injections which she states helped until last week.  She states her left is worse than her right.  She admits to pain going up and down stairs she has had injuries in the past to her knees.  She is requesting bilateral steroid injections    Allergies   Allergen Reactions    Bee Venom Anaphylaxis    Fd&C Yellow #5 (Tartrazine) Anaphylaxis     Patient states that she tolerates Keflex and Diflucan well by taking over-the-counter Benadryl along with the medications.    Neeta Anaphylaxis and Other (See Comments)    Iodinated Contrast Media Anaphylaxis, Itching and Other (See Comments)    Iodine Anaphylaxis    Meloxicam Shortness Of Breath and Swelling    Red Dye Shortness Of Breath    Methocarbamol Nausea And Vomiting    Oxycodone-Acetaminophen Itching, Swelling and Other (See Comments)    Famotidine Other (See Comments)     Pt experienced burning in vein and redness of arm above IV site    Food Color Red Other (See Comments)    Shellfish Allergy Other (See Comments)    Sumatriptan Swelling    Tramadol Other (See Comments)    Tramadol Hcl Other (See Comments)     ULCERS IN MOUTH    Adhesive Tape Rash        Social History     Socioeconomic History    Marital status:    Tobacco Use    Smoking status: Never    Smokeless tobacco: Never   Vaping Use    Vaping Use: Never used   Substance and Sexual Activity    Alcohol use: Never    Drug use: Never        REVIEW OF SYSTEMS    Constitutional: Awake alert and oriented x3, no acute distress, denies fevers, chills, weight loss  Respiratory: No respiratory  "distress  Vascular: Brisk cap refill, Intact distal pulses, No cyanosis, compartments soft with no signs or symptoms of compartment syndrome or DVT.   Cardiovascular: Denies chest pain, shortness of breath  Skin: Denies rashes, acute skin changes  Neurologic: Denies headache, loss of consciousness  MSK: Bilateral knee pain      Objective   Vital Signs:   /93   Pulse 85   Ht 170.2 cm (67\")   Wt (!) 181 kg (399 lb 0.5 oz)   SpO2 97%   BMI 62.50 kg/m²     Body mass index is 62.5 kg/m².    Physical Exam       Right knee- well healed scope scars. ROM -5 to 100 degrees. Medial joint line tenderness. Stable to varus/valgus stress. Stable to anterior/posterior drawer. Negative Lachman's. Negative Silas's. Positive EHL, FHL, GS and TA. Sensation intact to all 5 nerves of the foot. Positive pulses.      Left knee- ROM -5 to 105 degrees. Medial joint line tenderness. Stable to varus/valgus stress. Stable to anterior/posterior drawer. Negative Lachman's. Negative Silas's. Positive EHL, FHL, GS and TA. Sensation intact to all 5 nerves of the foot. Positive pulses.       Large Joint Arthrocentesis: R knee  Date/Time: 12/28/2023 1:02 PM  Consent given by: patient  Site marked: site marked  Timeout: Immediately prior to procedure a time out was called to verify the correct patient, procedure, equipment, support staff and site/side marked as required   Supporting Documentation  Indications: pain   Procedure Details  Location: knee - R knee  Preparation: Patient was prepped and draped in the usual sterile fashion  Needle gauge: 21 G.  Approach: lateral  Medications administered: 5 mL lidocaine 1 %; 40 mg triamcinolone acetonide 40 MG/ML  Patient tolerance: patient tolerated the procedure well with no immediate complications      Large Joint Arthrocentesis: L knee  Date/Time: 12/28/2023 1:02 PM  Consent given by: patient  Site marked: site marked  Timeout: Immediately prior to procedure a time out was called to verify " the correct patient, procedure, equipment, support staff and site/side marked as required   Supporting Documentation  Indications: pain   Procedure Details  Location: knee - L knee  Preparation: Patient was prepped and draped in the usual sterile fashion  Needle gauge: 21 G.  Approach: lateral  Medications administered: 5 mL lidocaine 1 %; 40 mg triamcinolone acetonide 40 MG/ML  Patient tolerance: patient tolerated the procedure well with no immediate complications        Imaging Results (Most Recent)       None             Result Review :   The following data was reviewed by: EVY Dacosta on 12/28/2023:               Assessment and Plan    Diagnoses and all orders for this visit:    1. Right knee pain, unspecified chronicity (Primary)    2. Left knee pain, unspecified chronicity    3. Bilateral primary osteoarthritis of knee    Other orders  -     Large Joint Arthrocentesis: R knee  -     Large Joint Arthrocentesis: L knee        Discussed diagnosis and treatment options with the patient she elected to have bilateral knee steroid injections which she tolerated well follow-up in 3 months    Call or return if worsening symptoms.    Follow Up   Return in about 3 months (around 3/28/2024) for Recheck.  Patient was given instructions and counseling regarding her condition or for health maintenance advice. Please see specific information pulled into the AVS if appropriate.       EMR Dragon/Transcription disclaimer:  Much of this encounter note is an electronic transcription/translation of spoken language to printed text, aka voice recognition.  The electronic translation of spoken language may permit erroneous or at times nonsensical words or phrases to be inadvertently transcribed; although I have reviewed the note for such errors, some may still exist so please interpret based on surrounding text content.

## 2024-01-13 ENCOUNTER — APPOINTMENT (OUTPATIENT)
Dept: GENERAL RADIOLOGY | Facility: HOSPITAL | Age: 54
End: 2024-01-13
Payer: MEDICARE

## 2024-01-13 ENCOUNTER — HOSPITAL ENCOUNTER (EMERGENCY)
Facility: HOSPITAL | Age: 54
Discharge: HOME | End: 2024-01-13
Attending: EMERGENCY MEDICINE
Payer: MEDICARE

## 2024-01-13 VITALS
OXYGEN SATURATION: 95 % | BODY MASS INDEX: 45.99 KG/M2 | WEIGHT: 293 LBS | TEMPERATURE: 101.6 F | RESPIRATION RATE: 18 BRPM | DIASTOLIC BLOOD PRESSURE: 83 MMHG | SYSTOLIC BLOOD PRESSURE: 152 MMHG | HEART RATE: 80 BPM | HEIGHT: 67 IN

## 2024-01-13 DIAGNOSIS — J10.1 INFLUENZA A: Primary | ICD-10-CM

## 2024-01-13 LAB
ALBUMIN SERPL-MCNC: 3.5 G/DL (ref 3.5–5.2)
ALBUMIN/GLOB SERPL: 0.9 G/DL
ALP SERPL-CCNC: 102 U/L (ref 39–117)
ALT SERPL W P-5'-P-CCNC: 17 U/L (ref 1–33)
ANION GAP SERPL CALCULATED.3IONS-SCNC: 11.7 MMOL/L (ref 5–15)
AST SERPL-CCNC: 18 U/L (ref 1–32)
BASOPHILS # BLD AUTO: 0.03 10*3/MM3 (ref 0–0.2)
BASOPHILS NFR BLD AUTO: 0.5 % (ref 0–1.5)
BILIRUB SERPL-MCNC: 0.6 MG/DL (ref 0–1.2)
BUN SERPL-MCNC: 9 MG/DL (ref 6–20)
BUN/CREAT SERPL: 8.7 (ref 7–25)
CALCIUM SPEC-SCNC: 9 MG/DL (ref 8.6–10.5)
CHLORIDE SERPL-SCNC: 99 MMOL/L (ref 98–107)
CO2 SERPL-SCNC: 24.3 MMOL/L (ref 22–29)
CREAT SERPL-MCNC: 1.03 MG/DL (ref 0.57–1)
D-LACTATE SERPL-SCNC: 1.2 MMOL/L (ref 0.5–2)
DEPRECATED RDW RBC AUTO: 45.6 FL (ref 37–54)
EGFRCR SERPLBLD CKD-EPI 2021: 66 ML/MIN/1.73
EOSINOPHIL # BLD AUTO: 0.01 10*3/MM3 (ref 0–0.4)
EOSINOPHIL NFR BLD AUTO: 0.2 % (ref 0.3–6.2)
ERYTHROCYTE [DISTWIDTH] IN BLOOD BY AUTOMATED COUNT: 14.5 % (ref 12.3–15.4)
FLUAV SUBTYP SPEC NAA+PROBE: DETECTED
FLUBV RNA ISLT QL NAA+PROBE: NOT DETECTED
GLOBULIN UR ELPH-MCNC: 4 GM/DL
GLUCOSE SERPL-MCNC: 111 MG/DL (ref 65–99)
HCT VFR BLD AUTO: 41 % (ref 34–46.6)
HGB BLD-MCNC: 13 G/DL (ref 12–15.9)
HOLD SPECIMEN: NORMAL
HOLD SPECIMEN: NORMAL
IMM GRANULOCYTES # BLD AUTO: 0.05 10*3/MM3 (ref 0–0.05)
IMM GRANULOCYTES NFR BLD AUTO: 0.9 % (ref 0–0.5)
LYMPHOCYTES # BLD AUTO: 0.51 10*3/MM3 (ref 0.7–3.1)
LYMPHOCYTES NFR BLD AUTO: 8.8 % (ref 19.6–45.3)
MCH RBC QN AUTO: 27.3 PG (ref 26.6–33)
MCHC RBC AUTO-ENTMCNC: 31.7 G/DL (ref 31.5–35.7)
MCV RBC AUTO: 86 FL (ref 79–97)
MONOCYTES # BLD AUTO: 0.68 10*3/MM3 (ref 0.1–0.9)
MONOCYTES NFR BLD AUTO: 11.7 % (ref 5–12)
NEUTROPHILS NFR BLD AUTO: 4.53 10*3/MM3 (ref 1.7–7)
NEUTROPHILS NFR BLD AUTO: 77.9 % (ref 42.7–76)
NRBC BLD AUTO-RTO: 0 /100 WBC (ref 0–0.2)
PLATELET # BLD AUTO: 332 10*3/MM3 (ref 140–450)
PMV BLD AUTO: 9.9 FL (ref 6–12)
POTASSIUM SERPL-SCNC: 4 MMOL/L (ref 3.5–5.2)
PROT SERPL-MCNC: 7.5 G/DL (ref 6–8.5)
RBC # BLD AUTO: 4.77 10*6/MM3 (ref 3.77–5.28)
RSV RNA NPH QL NAA+NON-PROBE: NOT DETECTED
SARS-COV-2 RNA RESP QL NAA+PROBE: NOT DETECTED
SODIUM SERPL-SCNC: 135 MMOL/L (ref 136–145)
WBC NRBC COR # BLD AUTO: 5.81 10*3/MM3 (ref 3.4–10.8)
WHOLE BLOOD HOLD COAG: NORMAL
WHOLE BLOOD HOLD SPECIMEN: NORMAL

## 2024-01-13 PROCEDURE — 25810000003 SODIUM CHLORIDE 0.9 % SOLUTION: Performed by: EMERGENCY MEDICINE

## 2024-01-13 PROCEDURE — 96374 THER/PROPH/DIAG INJ IV PUSH: CPT

## 2024-01-13 PROCEDURE — 85025 COMPLETE CBC W/AUTO DIFF WBC: CPT | Performed by: EMERGENCY MEDICINE

## 2024-01-13 PROCEDURE — 87637 SARSCOV2&INF A&B&RSV AMP PRB: CPT | Performed by: EMERGENCY MEDICINE

## 2024-01-13 PROCEDURE — 87040 BLOOD CULTURE FOR BACTERIA: CPT | Performed by: EMERGENCY MEDICINE

## 2024-01-13 PROCEDURE — 25010000002 ONDANSETRON PER 1 MG: Performed by: EMERGENCY MEDICINE

## 2024-01-13 PROCEDURE — 36415 COLL VENOUS BLD VENIPUNCTURE: CPT

## 2024-01-13 PROCEDURE — 83605 ASSAY OF LACTIC ACID: CPT | Performed by: EMERGENCY MEDICINE

## 2024-01-13 PROCEDURE — 71045 X-RAY EXAM CHEST 1 VIEW: CPT

## 2024-01-13 PROCEDURE — 80053 COMPREHEN METABOLIC PANEL: CPT | Performed by: EMERGENCY MEDICINE

## 2024-01-13 PROCEDURE — 99283 EMERGENCY DEPT VISIT LOW MDM: CPT

## 2024-01-13 RX ORDER — ONDANSETRON 4 MG/1
8 TABLET, ORALLY DISINTEGRATING ORAL EVERY 8 HOURS PRN
Qty: 20 TABLET | Refills: 0 | Status: SHIPPED | OUTPATIENT
Start: 2024-01-13

## 2024-01-13 RX ORDER — SODIUM CHLORIDE 0.9 % (FLUSH) 0.9 %
10 SYRINGE (ML) INJECTION AS NEEDED
Status: DISCONTINUED | OUTPATIENT
Start: 2024-01-13 | End: 2024-01-14 | Stop reason: HOSPADM

## 2024-01-13 RX ORDER — ONDANSETRON 2 MG/ML
8 INJECTION INTRAMUSCULAR; INTRAVENOUS ONCE
Status: COMPLETED | OUTPATIENT
Start: 2024-01-13 | End: 2024-01-13

## 2024-01-13 RX ADMIN — SODIUM CHLORIDE 1000 ML: 9 INJECTION, SOLUTION INTRAVENOUS at 20:17

## 2024-01-13 RX ADMIN — ONDANSETRON 8 MG: 2 INJECTION INTRAMUSCULAR; INTRAVENOUS at 20:17

## 2024-01-14 NOTE — ED PROVIDER NOTES
Time: 8:01 PM EST  Date of encounter:  1/13/2024  Independent Historian/Clinical History and Information was obtained by:   Patient    History is limited by: N/A    Chief Complaint: Not feeling well      History of Present Illness:  Patient is a 53 y.o. year old female who presents to the emergency department for evaluation of not feeling well.  Patient reports multiple autoimmune diseases and complains of nausea, vomiting, diarrhea and cough causing a headache.  She reports difficulty holding down fluids or food for the past 2 to 3 days.    HPI    Patient Care Team  Primary Care Provider: Janki Clayton MD    Past Medical History:     Allergies   Allergen Reactions    Bee Venom Anaphylaxis    Fd&C Yellow #5 (Tartrazine) Anaphylaxis     Patient states that she tolerates Keflex and Diflucan well by taking over-the-counter Benadryl along with the medications.    Neeta Anaphylaxis and Other (See Comments)    Iodinated Contrast Media Anaphylaxis, Itching and Other (See Comments)    Iodine Anaphylaxis    Meloxicam Shortness Of Breath and Swelling    Red Dye Shortness Of Breath    Methocarbamol Nausea And Vomiting    Oxycodone-Acetaminophen Itching, Swelling and Other (See Comments)    Famotidine Other (See Comments)     Pt experienced burning in vein and redness of arm above IV site    Food Color Red Other (See Comments)    Shellfish Allergy Other (See Comments)    Sumatriptan Swelling    Tramadol Other (See Comments)    Tramadol Hcl Other (See Comments)     ULCERS IN MOUTH    Adhesive Tape Rash     Past Medical History:   Diagnosis Date    A-fib     Asthma     Diabetes mellitus     Hypertension     Lymphedema     Migraine     Murmur     Sleep apnea      Past Surgical History:   Procedure Laterality Date    CHOLECYSTECTOMY      FOOT SURGERY      HYSTERECTOMY      INCONTINENCE SURGERY       History reviewed. No pertinent family history.    Home Medications:  Prior to Admission medications    Medication Sig Start Date  End Date Taking? Authorizing Provider   albuterol sulfate  (90 Base) MCG/ACT inhaler INHALE 2 PUFFS INTO THE LUNGS BY MOUTH EVERY 6 HOURS AS NEEDED FOR WHEEZING    Ernestine Jackson MD   Blood Glucose Monitoring Suppl (OneTouch Verio Reflect) w/Device kit Daily. 9/19/23   Ernestine Jackson MD   cetirizine (zyrTEC) 10 MG tablet Take 1 tablet by mouth Daily. 8/15/23 8/14/24  Ernestine Jackson MD   diclofenac (VOLTAREN) 75 MG EC tablet TAKE 1 TABLET BY MOUTH TWICE DAILY 11/27/23   Ben Herzog MD   diphenhydrAMINE (Benadryl Allergy) 25 MG tablet Take 1 tablet by mouth Every 6 (Six) Hours As Needed for Itching or Allergies (Take 4 times daily for next 4 days then as needed) for up to 30 doses. 4/7/21   Germeias Flores MD   esomeprazole (nexIUM) 40 MG capsule Take 1 capsule by mouth Every Morning Before Breakfast.    Ernestine Jackson MD   HYDROcodone-acetaminophen (NORCO) 5-325 MG per tablet Take 1 tablet by mouth Every 6 (Six) Hours. 12/5/23   Ernestine Jackson MD   metFORMIN ER (GLUCOPHAGE-XR) 500 MG 24 hr tablet Take 1 tablet by mouth Daily With Breakfast.    Ernestine Jackson MD   Trintellix 20 MG tablet Take 1 tablet by mouth Daily.    Ernestine Jackson MD        Social History:   Social History     Tobacco Use    Smoking status: Never    Smokeless tobacco: Never   Vaping Use    Vaping Use: Never used   Substance Use Topics    Alcohol use: Never    Drug use: Never         Review of Systems:  Review of Systems   Constitutional:  Negative for chills and fever.   HENT:  Negative for congestion, rhinorrhea and sore throat.    Eyes:  Negative for pain and visual disturbance.   Respiratory:  Positive for cough. Negative for apnea, chest tightness and shortness of breath.    Cardiovascular:  Negative for chest pain and palpitations.   Gastrointestinal:  Positive for diarrhea, nausea and vomiting. Negative for abdominal pain.   Genitourinary:  Negative for difficulty urinating  "and dysuria.   Musculoskeletal:  Negative for joint swelling and myalgias.   Skin:  Negative for color change.   Neurological:  Negative for seizures and headaches.   Psychiatric/Behavioral: Negative.     All other systems reviewed and are negative.       Physical Exam:  /83   Pulse 80   Temp (!) 101.6 °F (38.7 °C) (Oral)   Resp 18   Ht 170.2 cm (67\")   Wt (!) 173 kg (380 lb 8.2 oz)   SpO2 95%   BMI 59.60 kg/m²     Physical Exam  Vitals and nursing note reviewed.   Constitutional:       General: She is not in acute distress.     Appearance: Normal appearance. She is not toxic-appearing.   HENT:      Head: Normocephalic and atraumatic.      Jaw: There is normal jaw occlusion.   Eyes:      General: Lids are normal.      Extraocular Movements: Extraocular movements intact.      Conjunctiva/sclera: Conjunctivae normal.      Pupils: Pupils are equal, round, and reactive to light.   Cardiovascular:      Rate and Rhythm: Normal rate and regular rhythm.      Pulses: Normal pulses.      Heart sounds: Normal heart sounds.   Pulmonary:      Effort: Pulmonary effort is normal. No respiratory distress.      Breath sounds: Normal breath sounds. No wheezing or rhonchi.   Abdominal:      General: Abdomen is flat.      Palpations: Abdomen is soft.      Tenderness: There is no abdominal tenderness. There is no guarding or rebound.   Musculoskeletal:         General: Normal range of motion.      Cervical back: Normal range of motion and neck supple.      Right lower leg: No edema.      Left lower leg: No edema.   Skin:     General: Skin is warm and dry.   Neurological:      Mental Status: She is alert and oriented to person, place, and time. Mental status is at baseline.   Psychiatric:         Mood and Affect: Mood normal.                  Procedures:  Procedures      Medical Decision Making:      Comorbidities that affect care:    Lupus, rheumatoid arthritis, migraine headaches    External Notes reviewed:    Previous " Clinic Note: Family medicine office visit for general medical management      The following orders were placed and all results were independently analyzed by me:  Orders Placed This Encounter   Procedures    Blood Culture - Blood,    Blood Culture - Blood,    COVID-19, FLU A/B, RSV PCR 1 HR TAT - Swab, Nasopharynx    XR Chest 1 View    Comprehensive Metabolic Panel    Lactic Acid, Plasma    [place] Draw    CBC Auto Differential    Undress & Gown    Continuous Pulse Oximetry    Vital Signs    Oxygen Therapy- Nasal Cannula; Titrate 1-6 LPM Per SpO2; 90 - 95%    Insert Peripheral IV    CBC & Differential    Green Top (Gel)    Lavender Top    Gold Top - SST    Light Blue Top       Medications Given in the Emergency Department:  Medications   sodium chloride 0.9 % flush 10 mL (has no administration in time range)   sodium chloride 0.9 % bolus 1,000 mL (1,000 mL Intravenous New Bag 1/13/24 2017)   ondansetron (ZOFRAN) injection 8 mg (8 mg Intravenous Given 1/13/24 2017)        ED Course:         Labs:    Lab Results (last 24 hours)       Procedure Component Value Units Date/Time    COVID-19, FLU A/B, RSV PCR 1 HR TAT - Swab, Nasopharynx [[id number]]  (Abnormal) Collected: 01/13/24 1900    Specimen: Swab from Nasopharynx Updated: 01/13/24 1955     COVID19 Not Detected     Influenza A PCR Detected     Influenza B PCR Not Detected     RSV, PCR Not Detected    Narrative:      Fact sheet for providers: https://www.fda.gov/media/206560/download    Fact sheet for patients: https://www.fda.gov/media/249479/download    Test performed by PCR.    CBC & Differential [[id number]]  (Abnormal) Collected: 01/13/24 1941    Specimen: Blood Updated: 01/13/24 1951    Narrative:      The following orders were created for panel order CBC & Differential.  Procedure                               Abnormality         Status                     ---------                               -----------         ------                     CBC Auto  Differential[[id number]]        Abnormal            Final result                 Please view results for these tests on the individual orders.    Comprehensive Metabolic Panel [[id number]]  (Abnormal) Collected: 01/13/24 1941    Specimen: Blood Updated: 01/13/24 2007     Glucose 111 mg/dL      BUN 9 mg/dL      Creatinine 1.03 mg/dL      Sodium 135 mmol/L      Potassium 4.0 mmol/L      Chloride 99 mmol/L      CO2 24.3 mmol/L      Calcium 9.0 mg/dL      Total Protein 7.5 g/dL      Albumin 3.5 g/dL      ALT (SGPT) 17 U/L      AST (SGOT) 18 U/L      Alkaline Phosphatase 102 U/L      Total Bilirubin 0.6 mg/dL      Globulin 4.0 gm/dL      A/G Ratio 0.9 g/dL      BUN/Creatinine Ratio 8.7     Anion Gap 11.7 mmol/L      eGFR 66.0 mL/min/1.73     Narrative:      GFR Normal >60  Chronic Kidney Disease <60  Kidney Failure <15      Lactic Acid, Plasma [[id number]]  (Normal) Collected: 01/13/24 1941    Specimen: Blood Updated: 01/13/24 2003     Lactate 1.2 mmol/L     Blood Culture - Blood, Arm, Left [[id number]] Collected: 01/13/24 1941    Specimen: Blood from Arm, Left Updated: 01/13/24 1947    Blood Culture - Blood, Arm, Left [[id number]] Collected: 01/13/24 1941    Specimen: Blood from Arm, Left Updated: 01/13/24 1948    CBC Auto Differential [[id number]]  (Abnormal) Collected: 01/13/24 1941    Specimen: Blood Updated: 01/13/24 1951     WBC 5.81 10*3/mm3      RBC 4.77 10*6/mm3      Hemoglobin 13.0 g/dL      Hematocrit 41.0 %      MCV 86.0 fL      MCH 27.3 pg      MCHC 31.7 g/dL      RDW 14.5 %      RDW-SD 45.6 fl      MPV 9.9 fL      Platelets 332 10*3/mm3      Neutrophil % 77.9 %      Lymphocyte % 8.8 %      Monocyte % 11.7 %      Eosinophil % 0.2 %      Basophil % 0.5 %      Immature Grans % 0.9 %      Neutrophils, Absolute 4.53 10*3/mm3      Lymphocytes, Absolute 0.51 10*3/mm3      Monocytes, Absolute 0.68 10*3/mm3      Eosinophils, Absolute 0.01 10*3/mm3      Basophils, Absolute 0.03 10*3/mm3      Immature Grans, Absolute 0.05  10*3/mm3      nRBC 0.0 /100 WBC              Imaging:    XR Chest 1 View    Result Date: 1/13/2024  PROCEDURE: XR CHEST 1 VW  COMPARISON: 4/23/2023.  INDICATIONS: COUGH, SHORTNESS OF BREATH, H/O MIGRAINE(S), CHEST TIGHTNESS, & UNSPECIFIED WEAKNESS X 2 WEEKS.  FINDINGS:  A single AP (or PA) upright portable chest radiograph was performed.  Borderline cardiac enlargement is seen.  No acute infiltrate is appreciated.  No pleural effusion or pneumothorax is identified.  There is suspected mild levoscoliosis of the thoracic spine.  Degenerative changes involve the imaged spine and the bilateral shoulders.  Chronic calcified granulomatous disease may involve the chest.  No significant interval change is seen since the prior study (or studies).        No acute infiltrate is appreciated.     Please note that portions of this note were completed with a voice recognition program.  RANDI BECKER JR, MD       Electronically Signed and Approved By: RANDI BECKER JR, MD on 1/13/2024 at 21:06                 Differential Diagnosis and Discussion:    Cough: Differential diagnosis includes but is not limited to pneumonia, acute bronchitis, upper respiratory infection, ACE inhibitor use, allergic reaction, epiglottitis, seasonal allergies, chemical irritants, exercise-induced asthma, viral syndrome.  Metabolic: Differential diagnosis includes but is not limited to hypertension, hyperglycemia, hyperkalemia, hypocalcemia, metabolic acidosis, hypokalemia, hypoglycemia, malnutrition, hypothyroidism, hyperthyroidism, and adrenal insufficiency.     All labs were reviewed and interpreted by me.  All X-rays impressions were independently interpreted by me.    MDM  Number of Diagnoses or Management Options  Influenza A  Diagnosis management comments: In summary this is a 53-year-old female who presents to the emergency department for evaluation of not feeling well.  CBC independently reviewed by me and shows no critical abnormalities.  CMP  independently reviewed by me and shows no critical abnormalities.  Chest x-ray unremarkable and negative for pneumonia.  COVID-19 test negative.  RSV test negative.  Influenza test positive for influenza A.  Patient has received medications including antiemetics in the emergency room with improvement of her symptoms.  We discussed possible prescription for Tamiflu however she has declined given the probable GI upset.  Very strict return to ER and follow-up instructions have been provided to the patient.                   Patient Care Considerations:    CT CHEST: I considered ordering a CT scan of the chest, however no respiratory distress      Consultants/Shared Management Plan:    None    Social Determinants of Health:    Patient is independent, reliable, and has access to care.       Disposition and Care Coordination:    Discharged: The patient is suitable and stable for discharge with no need for consideration of observation or admission.    I have explained the patient´s condition, diagnoses and treatment plan based on the information available to me at this time. I have answered questions and addressed any concerns. The patient has a good  understanding of the patient´s diagnosis, condition, and treatment plan as can be expected at this point. The vital signs have been stable. The patient´s condition is stable and appropriate for discharge from the emergency department.      The patient will pursue further outpatient evaluation with the primary care physician or other designated or consulting physician as outlined in the discharge instructions. They are agreeable to this plan of care and follow-up instructions have been explained in detail. The patient has received these instructions in written format and have expressed an understanding of the discharge instructions. The patient is aware that any significant change in condition or worsening of symptoms should prompt an immediate return to this or the closest  emergency department or call to 911.  I have explained discharge medications and the need for follow up with the patient/caretakers. This was also printed in the discharge instructions. Patient was discharged with the following medications and follow up:      Medication List        New Prescriptions      ondansetron ODT 4 MG disintegrating tablet  Commonly known as: ZOFRAN-ODT  Place 2 tablets on the tongue Every 8 (Eight) Hours As Needed for Nausea or Vomiting.               Where to Get Your Medications        These medications were sent to [facility]/pharm[facility] #[id number] - THANH Phelps - [address] - [phone]  - [phone] FX  [address] [place] [address]      Hours: 24-hours Phone: [phone]   ondansetron ODT 4 MG disintegrating tablet      Janki Clayton MD  [address] [place]  [address]  [place] [address]  [phone]    In 1 week         Final diagnoses:   Influenza A        ED Disposition       ED Disposition   Discharge    Condition   Stable    Comment   --               This medical record created using voice recognition software.             Santos Garcia MD  01/13/24 4073

## 2024-01-18 LAB
BACTERIA SPEC AEROBE CULT: NORMAL
BACTERIA SPEC AEROBE CULT: NORMAL

## 2024-01-26 ENCOUNTER — TELEPHONE (OUTPATIENT)
Dept: ORTHOPEDIC SURGERY | Facility: CLINIC | Age: 54
End: 2024-01-26
Payer: MEDICARE

## 2024-01-26 NOTE — TELEPHONE ENCOUNTER
LEFT VM TO R/S 03.29.24 APPT. - [facility] OUT OF OFFICE - SENDING [employer] MESSAGE    OK TO SCHEDULE W/[facility] ANYTIME THAT WEEK OR AT NEXT AVAILABLE AFTER 03.29.24    HUB OK TO RELAY AND R/S

## 2024-02-23 DIAGNOSIS — M25.561 RIGHT KNEE PAIN, UNSPECIFIED CHRONICITY: ICD-10-CM

## 2024-02-23 DIAGNOSIS — M17.0 BILATERAL PRIMARY OSTEOARTHRITIS OF KNEE: ICD-10-CM

## 2024-02-23 DIAGNOSIS — M25.562 LEFT KNEE PAIN, UNSPECIFIED CHRONICITY: ICD-10-CM

## 2024-02-23 RX ORDER — DICLOFENAC SODIUM 75 MG/1
75 TABLET, DELAYED RELEASE ORAL 2 TIMES DAILY
Qty: 60 TABLET | Refills: 2 | Status: SHIPPED | OUTPATIENT
Start: 2024-02-23

## 2024-04-16 ENCOUNTER — OFFICE VISIT (OUTPATIENT)
Dept: ORTHOPEDIC SURGERY | Facility: CLINIC | Age: 54
End: 2024-04-16
Payer: MEDICARE

## 2024-04-16 VITALS
HEIGHT: 67 IN | DIASTOLIC BLOOD PRESSURE: 89 MMHG | BODY MASS INDEX: 45.99 KG/M2 | HEART RATE: 76 BPM | OXYGEN SATURATION: 96 % | SYSTOLIC BLOOD PRESSURE: 157 MMHG | WEIGHT: 293 LBS

## 2024-04-16 DIAGNOSIS — M25.561 RIGHT KNEE PAIN, UNSPECIFIED CHRONICITY: Primary | ICD-10-CM

## 2024-04-16 DIAGNOSIS — M17.0 BILATERAL PRIMARY OSTEOARTHRITIS OF KNEE: ICD-10-CM

## 2024-04-16 DIAGNOSIS — M25.562 LEFT KNEE PAIN, UNSPECIFIED CHRONICITY: ICD-10-CM

## 2024-04-16 PROCEDURE — 99213 OFFICE O/P EST LOW 20 MIN: CPT | Performed by: PHYSICIAN ASSISTANT

## 2024-04-16 PROCEDURE — 1159F MED LIST DOCD IN RCRD: CPT | Performed by: PHYSICIAN ASSISTANT

## 2024-04-16 PROCEDURE — 20610 DRAIN/INJ JOINT/BURSA W/O US: CPT | Performed by: PHYSICIAN ASSISTANT

## 2024-04-16 PROCEDURE — 1160F RVW MEDS BY RX/DR IN RCRD: CPT | Performed by: PHYSICIAN ASSISTANT

## 2024-04-16 RX ORDER — LIDOCAINE HYDROCHLORIDE 10 MG/ML
5 INJECTION, SOLUTION INFILTRATION; PERINEURAL
Status: COMPLETED | OUTPATIENT
Start: 2024-04-16 | End: 2024-04-16

## 2024-04-16 RX ORDER — IPRATROPIUM BROMIDE AND ALBUTEROL SULFATE 2.5; .5 MG/3ML; MG/3ML
SOLUTION RESPIRATORY (INHALATION)
COMMUNITY
Start: 2023-12-30

## 2024-04-16 RX ORDER — LORAZEPAM 1 MG/1
TABLET ORAL
COMMUNITY
Start: 2024-02-21

## 2024-04-16 RX ORDER — TRIAMCINOLONE ACETONIDE 40 MG/ML
40 INJECTION, SUSPENSION INTRA-ARTICULAR; INTRAMUSCULAR
Status: COMPLETED | OUTPATIENT
Start: 2024-04-16 | End: 2024-04-16

## 2024-04-16 RX ORDER — LANCETS 33 GAUGE
EACH MISCELLANEOUS
COMMUNITY
Start: 2024-01-23

## 2024-04-16 RX ORDER — FLUTICASONE FUROATE AND VILANTEROL TRIFENATATE 100; 25 UG/1; UG/1
1 POWDER RESPIRATORY (INHALATION) DAILY
COMMUNITY
Start: 2023-12-30

## 2024-04-16 RX ORDER — HYDROCODONE BITARTRATE AND ACETAMINOPHEN 10; 325 MG/1; MG/1
1 TABLET ORAL 3 TIMES DAILY
COMMUNITY
Start: 2024-03-23

## 2024-04-16 RX ORDER — LEVOTHYROXINE SODIUM 0.05 MG/1
1 TABLET ORAL DAILY
COMMUNITY
Start: 2024-03-28

## 2024-04-16 RX ADMIN — LIDOCAINE HYDROCHLORIDE 5 ML: 10 INJECTION, SOLUTION INFILTRATION; PERINEURAL at 13:34

## 2024-04-16 RX ADMIN — TRIAMCINOLONE ACETONIDE 40 MG: 40 INJECTION, SUSPENSION INTRA-ARTICULAR; INTRAMUSCULAR at 13:35

## 2024-04-16 RX ADMIN — LIDOCAINE HYDROCHLORIDE 5 ML: 10 INJECTION, SOLUTION INFILTRATION; PERINEURAL at 13:35

## 2024-04-16 RX ADMIN — TRIAMCINOLONE ACETONIDE 40 MG: 40 INJECTION, SUSPENSION INTRA-ARTICULAR; INTRAMUSCULAR at 13:34

## 2024-04-16 NOTE — PROGRESS NOTES
Chief Complaint  Pain and Follow-up of the Left Knee and Pain and Follow-up of the Right Knee    Subjective          Pain        Mina Rivera is a 53 y.o. female  presents to [facility] for     Patient presents for follow-up evaluation of bilateral knee pain and bilateral knee osteoarthritis she was last seen 12/28/2023 and received steroid injections which she states does help her knee pain she states the injections helped until about 6 weeks ago her pain returned she states the left is worse than the right she denies new injury or symptoms of pain states pain is similar to past episodes.  She is requesting bilateral knee injections      Allergies   Allergen Reactions    Bee Venom Anaphylaxis    Fd&C Yellow #5 (Tartrazine) Anaphylaxis     Patient states that she tolerates Keflex and Diflucan well by taking over-the-counter Benadryl along with the medications.    Neeta Anaphylaxis and Other (See Comments)    Iodinated Contrast Media Anaphylaxis, Itching and Other (See Comments)    Iodine Anaphylaxis    Meloxicam Shortness Of Breath and Swelling    Red Dye Shortness Of Breath    Methocarbamol Nausea And Vomiting    Oxycodone-Acetaminophen Itching, Swelling and Other (See Comments)    Famotidine Other (See Comments)     Pt experienced burning in vein and redness of arm above IV site    Food Color Red Other (See Comments)    Shellfish Allergy Other (See Comments)    Sumatriptan Swelling    Tramadol Other (See Comments)    Tramadol Hcl Other (See Comments)     ULCERS IN MOUTH    Adhesive Tape Rash        Social History     Socioeconomic History    Marital status:    Tobacco Use    Smoking status: Never    Smokeless tobacco: Never   Vaping Use    Vaping status: Never Used   Substance and Sexual Activity    Alcohol use: Never    Drug use: Never        REVIEW OF SYSTEMS    Constitutional: Awake alert and oriented x3, no acute distress, denies fevers, chills, weight loss  Respiratory:  "No respiratory distress  Vascular: Brisk cap refill, Intact distal pulses, No cyanosis, compartments soft with no signs or symptoms of compartment syndrome or DVT.   Cardiovascular: Denies chest pain, shortness of breath  Skin: Denies rashes, acute skin changes  Neurologic: Denies headache, loss of consciousness  MSK: Bilateral knee pain      Objective   Vital Signs:   /89   Pulse 76   Ht 170.2 cm (67\")   Wt (!) 174 kg (383 lb 9.6 oz)   SpO2 96%   BMI 60.08 kg/m²     Body mass index is 60.08 kg/m².    Physical Exam          Right knee- well healed scope scars. ROM -5 to 100 degrees. Medial joint line tenderness. Stable to varus/valgus stress. Stable to anterior/posterior drawer. Negative Lachman's. Negative Silas's. Positive EHL, FHL, GS and TA. Sensation intact to all 5 nerves of the foot. Positive pulses.      Left knee- ROM -5 to 105 degrees. Medial joint line tenderness. Stable to varus/valgus stress. Stable to anterior/posterior drawer. Negative Lachman's. Negative Silas's. Positive EHL, FHL, GS and TA. Sensation intact to all 5 nerves of the foot. Positive pulses.          Large Joint Arthrocentesis: R knee  Date/Time: 4/16/2024 1:34 PM  Consent given by: patient  Site marked: site marked  Timeout: Immediately prior to procedure a time out was called to verify the correct patient, procedure, equipment, support staff and site/side marked as required   Supporting Documentation  Indications: pain   Procedure Details  Location: knee - R knee  Preparation: Patient was prepped and draped in the usual sterile fashion  Needle gauge: 21 G.  Approach: lateral  Medications administered: 5 mL lidocaine 1 %; 40 mg triamcinolone acetonide 40 MG/ML  Patient tolerance: patient tolerated the procedure well with no immediate complications (injection scribed for Tal RATLIFF)      Large Joint Arthrocentesis: L knee  Date/Time: 4/16/2024 1:35 PM  Consent given by: patient  Site marked: site marked  Timeout: " Immediately prior to procedure a time out was called to verify the correct patient, procedure, equipment, support staff and site/side marked as required   Supporting Documentation  Indications: pain   Procedure Details  Location: knee - L knee  Preparation: Patient was prepped and draped in the usual sterile fashion  Needle gauge: 21 G.  Approach: lateral  Medications administered: 5 mL lidocaine 1 %; 40 mg triamcinolone acetonide 40 MG/ML  Patient tolerance: patient tolerated the procedure well with no immediate complications (injection scribed by Tal RATLIFF)        Imaging Results (Most Recent)       None             Result Review :   The following data was reviewed by: EVY Dacosta on 04/16/2024:               Assessment and Plan    Diagnoses and all orders for this visit:    1. Right knee pain, unspecified chronicity (Primary)  -     Miscellaneous DME    2. Left knee pain, unspecified chronicity  -     Miscellaneous DME    3. Bilateral primary osteoarthritis of knee  -     Miscellaneous DME    Other orders  -     Large Joint Arthrocentesis: R knee  -     Large Joint Arthrocentesis: L knee        Discussed diagnosis and treatment options with the patient she elected to have bilateral knee steroid injections which she tolerated well.  Patient states she would benefit from having a rolling walker to aid with her ambulation she also has fibromyalgia but this would help her knee pain and arthritis.  She was given order for this.  Follow-up in 3 months    Call or return if worsening symptoms.    Follow Up   Return in about 3 months (around 7/16/2024) for Recheck.  Patient was given instructions and counseling regarding her condition or for health maintenance advice. Please see specific information pulled into the AVS if appropriate.       EMR Dragon/Transcription disclaimer:  Part of this note may be an electronic transcription/translation of spoken language to printed text using the Dragon  Dictation System

## 2024-07-07 ENCOUNTER — HOSPITAL ENCOUNTER (EMERGENCY)
Facility: HOSPITAL | Age: 54
Discharge: HOME | End: 2024-07-07
Attending: EMERGENCY MEDICINE | Admitting: EMERGENCY MEDICINE
Payer: MEDICARE

## 2024-07-07 ENCOUNTER — APPOINTMENT (OUTPATIENT)
Dept: CT IMAGING | Facility: HOSPITAL | Age: 54
End: 2024-07-07
Payer: MEDICARE

## 2024-07-07 VITALS
WEIGHT: 293 LBS | TEMPERATURE: 98 F | BODY MASS INDEX: 45.99 KG/M2 | HEIGHT: 67 IN | SYSTOLIC BLOOD PRESSURE: 136 MMHG | HEART RATE: 73 BPM | DIASTOLIC BLOOD PRESSURE: 65 MMHG | OXYGEN SATURATION: 99 % | RESPIRATION RATE: 18 BRPM

## 2024-07-07 DIAGNOSIS — R10.9 LEFT FLANK PAIN: Primary | ICD-10-CM

## 2024-07-07 DIAGNOSIS — N39.0 URINARY TRACT INFECTION WITHOUT HEMATURIA, SITE UNSPECIFIED: ICD-10-CM

## 2024-07-07 DIAGNOSIS — R10.84 GENERALIZED ABDOMINAL PAIN: ICD-10-CM

## 2024-07-07 LAB
ALBUMIN SERPL-MCNC: 3.7 G/DL (ref 3.5–5.2)
ALBUMIN/GLOB SERPL: 1.1 G/DL
ALP SERPL-CCNC: 99 U/L (ref 39–117)
ALT SERPL W P-5'-P-CCNC: 14 U/L (ref 1–33)
ANION GAP SERPL CALCULATED.3IONS-SCNC: 10 MMOL/L (ref 5–15)
AST SERPL-CCNC: 18 U/L (ref 1–32)
BACTERIA UR QL AUTO: ABNORMAL /HPF
BASOPHILS # BLD AUTO: 0.05 10*3/MM3 (ref 0–0.2)
BASOPHILS NFR BLD AUTO: 0.6 % (ref 0–1.5)
BILIRUB SERPL-MCNC: 0.3 MG/DL (ref 0–1.2)
BILIRUB UR QL STRIP: NEGATIVE
BUN SERPL-MCNC: 7 MG/DL (ref 6–20)
BUN/CREAT SERPL: 9 (ref 7–25)
CALCIUM SPEC-SCNC: 8.7 MG/DL (ref 8.6–10.5)
CHLORIDE SERPL-SCNC: 105 MMOL/L (ref 98–107)
CLARITY UR: ABNORMAL
CO2 SERPL-SCNC: 25 MMOL/L (ref 22–29)
COLOR UR: YELLOW
CREAT SERPL-MCNC: 0.78 MG/DL (ref 0.57–1)
DEPRECATED RDW RBC AUTO: 47.8 FL (ref 37–54)
EGFRCR SERPLBLD CKD-EPI 2021: 92.1 ML/MIN/1.73
EOSINOPHIL # BLD AUTO: 0.19 10*3/MM3 (ref 0–0.4)
EOSINOPHIL NFR BLD AUTO: 2.3 % (ref 0.3–6.2)
ERYTHROCYTE [DISTWIDTH] IN BLOOD BY AUTOMATED COUNT: 15.3 % (ref 12.3–15.4)
GLOBULIN UR ELPH-MCNC: 3.3 GM/DL
GLUCOSE SERPL-MCNC: 125 MG/DL (ref 65–99)
GLUCOSE UR STRIP-MCNC: NEGATIVE MG/DL
HCT VFR BLD AUTO: 39.5 % (ref 34–46.6)
HGB BLD-MCNC: 12.4 G/DL (ref 12–15.9)
HGB UR QL STRIP.AUTO: NEGATIVE
HOLD SPECIMEN: NORMAL
HYALINE CASTS UR QL AUTO: ABNORMAL /LPF
IMM GRANULOCYTES # BLD AUTO: 0.04 10*3/MM3 (ref 0–0.05)
IMM GRANULOCYTES NFR BLD AUTO: 0.5 % (ref 0–0.5)
KETONES UR QL STRIP: ABNORMAL
LEUKOCYTE ESTERASE UR QL STRIP.AUTO: ABNORMAL
LIPASE SERPL-CCNC: 26 U/L (ref 13–60)
LYMPHOCYTES # BLD AUTO: 2.32 10*3/MM3 (ref 0.7–3.1)
LYMPHOCYTES NFR BLD AUTO: 27.8 % (ref 19.6–45.3)
MCH RBC QN AUTO: 27 PG (ref 26.6–33)
MCHC RBC AUTO-ENTMCNC: 31.4 G/DL (ref 31.5–35.7)
MCV RBC AUTO: 86.1 FL (ref 79–97)
MONOCYTES # BLD AUTO: 0.47 10*3/MM3 (ref 0.1–0.9)
MONOCYTES NFR BLD AUTO: 5.6 % (ref 5–12)
NEUTROPHILS NFR BLD AUTO: 5.28 10*3/MM3 (ref 1.7–7)
NEUTROPHILS NFR BLD AUTO: 63.2 % (ref 42.7–76)
NITRITE UR QL STRIP: NEGATIVE
NRBC BLD AUTO-RTO: 0 /100 WBC (ref 0–0.2)
PH UR STRIP.AUTO: 6 [PH] (ref 5–8)
PLATELET # BLD AUTO: 376 10*3/MM3 (ref 140–450)
PMV BLD AUTO: 10 FL (ref 6–12)
POTASSIUM SERPL-SCNC: 3.7 MMOL/L (ref 3.5–5.2)
PROT SERPL-MCNC: 7 G/DL (ref 6–8.5)
PROT UR QL STRIP: ABNORMAL
RBC # BLD AUTO: 4.59 10*6/MM3 (ref 3.77–5.28)
RBC # UR STRIP: ABNORMAL /HPF
REF LAB TEST METHOD: ABNORMAL
SODIUM SERPL-SCNC: 140 MMOL/L (ref 136–145)
SP GR UR STRIP: 1.02 (ref 1–1.03)
SQUAMOUS #/AREA URNS HPF: ABNORMAL /HPF
TROPONIN T SERPL HS-MCNC: 9 NG/L
UROBILINOGEN UR QL STRIP: ABNORMAL
WBC # UR STRIP: ABNORMAL /HPF
WBC NRBC COR # BLD AUTO: 8.35 10*3/MM3 (ref 3.4–10.8)
WHOLE BLOOD HOLD COAG: NORMAL
WHOLE BLOOD HOLD SPECIMEN: NORMAL

## 2024-07-07 PROCEDURE — 84484 ASSAY OF TROPONIN QUANT: CPT

## 2024-07-07 PROCEDURE — 25010000002 CEFTRIAXONE PER 250 MG: Performed by: NURSE PRACTITIONER

## 2024-07-07 PROCEDURE — 25010000002 METOCLOPRAMIDE PER 10 MG: Performed by: NURSE PRACTITIONER

## 2024-07-07 PROCEDURE — 85025 COMPLETE CBC W/AUTO DIFF WBC: CPT

## 2024-07-07 PROCEDURE — 81001 URINALYSIS AUTO W/SCOPE: CPT | Performed by: EMERGENCY MEDICINE

## 2024-07-07 PROCEDURE — 96365 THER/PROPH/DIAG IV INF INIT: CPT

## 2024-07-07 PROCEDURE — 93005 ELECTROCARDIOGRAM TRACING: CPT | Performed by: EMERGENCY MEDICINE

## 2024-07-07 PROCEDURE — 93005 ELECTROCARDIOGRAM TRACING: CPT

## 2024-07-07 PROCEDURE — 25010000002 ONDANSETRON PER 1 MG: Performed by: EMERGENCY MEDICINE

## 2024-07-07 PROCEDURE — 80053 COMPREHEN METABOLIC PANEL: CPT

## 2024-07-07 PROCEDURE — 99284 EMERGENCY DEPT VISIT MOD MDM: CPT

## 2024-07-07 PROCEDURE — 96375 TX/PRO/DX INJ NEW DRUG ADDON: CPT

## 2024-07-07 PROCEDURE — 74176 CT ABD & PELVIS W/O CONTRAST: CPT

## 2024-07-07 PROCEDURE — 87086 URINE CULTURE/COLONY COUNT: CPT | Performed by: NURSE PRACTITIONER

## 2024-07-07 PROCEDURE — 83690 ASSAY OF LIPASE: CPT

## 2024-07-07 PROCEDURE — 25010000002 MORPHINE PER 10 MG: Performed by: EMERGENCY MEDICINE

## 2024-07-07 RX ORDER — METOCLOPRAMIDE HYDROCHLORIDE 5 MG/ML
10 INJECTION INTRAMUSCULAR; INTRAVENOUS ONCE
Status: COMPLETED | OUTPATIENT
Start: 2024-07-07 | End: 2024-07-07

## 2024-07-07 RX ORDER — SODIUM CHLORIDE 0.9 % (FLUSH) 0.9 %
10 SYRINGE (ML) INJECTION AS NEEDED
Status: DISCONTINUED | OUTPATIENT
Start: 2024-07-07 | End: 2024-07-07 | Stop reason: HOSPADM

## 2024-07-07 RX ORDER — ONDANSETRON 4 MG/1
4 TABLET, ORALLY DISINTEGRATING ORAL EVERY 8 HOURS PRN
Qty: 10 TABLET | Refills: 0 | Status: SHIPPED | OUTPATIENT
Start: 2024-07-07

## 2024-07-07 RX ORDER — PROMETHAZINE HYDROCHLORIDE 25 MG/1
25 TABLET ORAL EVERY 6 HOURS PRN
Qty: 10 TABLET | Refills: 0 | Status: SHIPPED | OUTPATIENT
Start: 2024-07-07

## 2024-07-07 RX ORDER — DIPHENHYDRAMINE HCL 25 MG
25 CAPSULE ORAL EVERY 6 HOURS
Qty: 28 CAPSULE | Refills: 0 | Status: SHIPPED | OUTPATIENT
Start: 2024-07-07 | End: 2024-07-14

## 2024-07-07 RX ORDER — CEPHALEXIN 500 MG/1
500 CAPSULE ORAL 4 TIMES DAILY
Qty: 28 CAPSULE | Refills: 0 | Status: SHIPPED | OUTPATIENT
Start: 2024-07-07 | End: 2024-07-14

## 2024-07-07 RX ORDER — ONDANSETRON 2 MG/ML
4 INJECTION INTRAMUSCULAR; INTRAVENOUS ONCE
Status: COMPLETED | OUTPATIENT
Start: 2024-07-07 | End: 2024-07-07

## 2024-07-07 RX ADMIN — Medication 10 ML: at 02:47

## 2024-07-07 RX ADMIN — CEFTRIAXONE SODIUM 2000 MG: 2 INJECTION, POWDER, FOR SOLUTION INTRAMUSCULAR; INTRAVENOUS at 04:49

## 2024-07-07 RX ADMIN — MORPHINE SULFATE 4 MG: 4 INJECTION, SOLUTION INTRAMUSCULAR; INTRAVENOUS at 02:47

## 2024-07-07 RX ADMIN — ONDANSETRON 4 MG: 2 INJECTION INTRAMUSCULAR; INTRAVENOUS at 02:46

## 2024-07-07 RX ADMIN — Medication 10 ML: at 02:48

## 2024-07-07 RX ADMIN — Medication 10 ML: at 02:46

## 2024-07-07 RX ADMIN — METOCLOPRAMIDE HYDROCHLORIDE 10 MG: 5 INJECTION INTRAMUSCULAR; INTRAVENOUS at 04:49

## 2024-07-07 NOTE — ED TRIAGE NOTES
Pt comes to the ER tonight for upper abd pain all the way down to her lower abd and into her back. Pt states that belching and farting make it worse. Pt states that it started yesterday and has gotten worse. Pt is also having nausea.

## 2024-07-07 NOTE — ED PROVIDER NOTES
Time: 2:03 AM EDT  Date of encounter:  7/7/2024  Independent Historian/Clinical History and Information was obtained by:   Patient    History is limited by: N/A    Chief Complaint: Abdomen pain      History of Present Illness:  Patient is a 53 y.o. year old female who presents to the emergency department for evaluation of abdominal pain with nausea and 1 episode of vomiting up bile.  Patient states she has diffuse abdominal pain and left flank pain as well as left rib pain that started on Friday and has progressively worsened.  She states she feels like she is belching and feeling like she has gas like if she could just pass gas more it would feel better.  She has had some loose stools when she has had urination.  Denies any dysuria.  No fever.    HPI    Patient Care Team  Primary Care Provider: Janki Clayton MD    Past Medical History:     Allergies   Allergen Reactions    Bee Venom Anaphylaxis    Fd&C Yellow #5 (Tartrazine) Anaphylaxis     Patient states that she tolerates Keflex and Diflucan well by taking over-the-counter Benadryl along with the medications.    Neeta Anaphylaxis and Other (See Comments)    Iodinated Contrast Media Anaphylaxis, Itching and Other (See Comments)    Iodine Anaphylaxis    Meloxicam Shortness Of Breath and Swelling    Red Dye Shortness Of Breath    Methocarbamol Nausea And Vomiting    Oxycodone-Acetaminophen Itching, Swelling and Other (See Comments)    Famotidine Other (See Comments)     Pt experienced burning in vein and redness of arm above IV site    Food Color Red Other (See Comments)    Shellfish Allergy Other (See Comments)    Sumatriptan Swelling    Tramadol Other (See Comments)    Tramadol Hcl Other (See Comments)     ULCERS IN MOUTH    Adhesive Tape Rash     Past Medical History:   Diagnosis Date    A-fib     Asthma     Diabetes mellitus     Hypertension     Lymphedema     Migraine     Murmur     Sleep apnea      Past Surgical History:   Procedure Laterality Date     CHOLECYSTECTOMY      FOOT SURGERY      HYSTERECTOMY      INCONTINENCE SURGERY       History reviewed. No pertinent family history.    Home Medications:  Prior to Admission medications    Medication Sig Start Date End Date Taking? Authorizing Provider   albuterol sulfate  (90 Base) MCG/ACT inhaler INHALE 2 PUFFS INTO THE LUNGS BY MOUTH EVERY 6 HOURS AS NEEDED FOR WHEEZING    Ernestine Jackson MD   Blood Glucose Monitoring Suppl (OneTouch Verio Reflect) w/Device kit Daily. 9/19/23   Ernestine Jackson MD Breo Ellipta 100-25 MCG/ACT aerosol powder  Inhale 1 puff Daily. 12/30/23   Ernestine Jackson MD   cetirizine (zyrTEC) 10 MG tablet Take 1 tablet by mouth Daily. 8/15/23 8/14/24  Ernestine Jackson MD   diclofenac (VOLTAREN) 75 MG EC tablet TAKE 1 TABLET BY MOUTH TWICE DAILY 2/23/24   Ben Herzog MD   diphenhydrAMINE (Benadryl Allergy) 25 MG tablet Take 1 tablet by mouth Every 6 (Six) Hours As Needed for Itching or Allergies (Take 4 times daily for next 4 days then as needed) for up to 30 doses. 4/7/21   Geremias Flores MD   esomeprazole (nexIUM) 40 MG capsule Take 1 capsule by mouth Every Morning Before Breakfast.    Ernestine Jackson MD   HYDROcodone-acetaminophen (NORCO) 7.5-325 MG per tablet Take 1 tablet by mouth 4 (Four) Times a Day. 3/23/24   Ernestine Jackson MD   ipratropium-albuterol (DUO-NEB) 0.5-2.5 mg/3 ml nebulizer TAKE 1 AMPULE BY MOUTH VIA NEBULIZATION EVERY 6 HOURS AS NEEDED FOR WHEEZING 12/30/23   Ernestine Jackson MD   Lancets (OneTouch Delica Plus [id number]) misc USE THREE TIMES DAILY AS NEEDED 1/23/24   Ernestine Jackson MD   levothyroxine (SYNTHROID, LEVOTHROID) 50 MCG tablet Take 1 tablet by mouth Daily. 3/28/24   Ernestine Jackson MD   LORazepam (ATIVAN) 1 MG tablet TAKE 1/2 TO 1 TABLET BY MOUTH EVERY 6 HOURS AS NEEDED FOR ANXIETY. MAX DAILY AMOUNT: 4 MG. MAX DAILY AMOUNT: 4 MG 2/21/24   Ernestine Jackson MD   metFORMIN ER  "(GLUCOPHAGE-XR) 500 MG 24 hr tablet Take 1 tablet by mouth Daily With Breakfast.    Provider, MD Ernestine   ondansetron ODT (ZOFRAN-ODT) 4 MG disintegrating tablet Place 2 tablets on the tongue Every 8 (Eight) Hours As Needed for Nausea or Vomiting. 1/13/24   Santos Garcia MD   Trintellix 20 MG tablet Take 1 tablet by mouth Daily.    Provider, MD Ernestine        Social History:   Social History     Tobacco Use    Smoking status: Never    Smokeless tobacco: Never   Vaping Use    Vaping status: Never Used   Substance Use Topics    Alcohol use: Never    Drug use: Never         Review of Systems:  Review of Systems   Constitutional:  Negative for chills and fever.   HENT:  Negative for congestion, ear pain and sore throat.    Eyes:  Negative for pain.   Respiratory:  Negative for cough, chest tightness and shortness of breath.    Cardiovascular:  Positive for chest pain (Left ribs and left flank).   Gastrointestinal:  Positive for abdominal pain, diarrhea, nausea and vomiting. Negative for rectal pain.   Genitourinary:  Positive for flank pain. Negative for hematuria.   Musculoskeletal:  Negative for joint swelling.   Skin:  Negative for color change and pallor.   Neurological:  Negative for seizures and headaches.   Hematological: Negative.    Psychiatric/Behavioral: Negative.     All other systems reviewed and are negative.       Physical Exam:  /68 (BP Location: Right arm, Patient Position: Sitting)   Pulse 69   Temp 97.7 °F (36.5 °C) (Oral)   Resp 18   Ht 170.2 cm (67\")   Wt (!) 176 kg (388 lb 10.7 oz)   SpO2 99%   BMI 60.87 kg/m²     Physical Exam  Vitals and nursing note reviewed.   Constitutional:       General: She is not in acute distress.     Appearance: Normal appearance. She is obese. She is not toxic-appearing.   HENT:      Head: Normocephalic and atraumatic.      Mouth/Throat:      Mouth: Mucous membranes are moist.   Eyes:      General: No scleral icterus.  Cardiovascular:      Rate " and Rhythm: Normal rate and regular rhythm.      Pulses: Normal pulses.      Heart sounds: Normal heart sounds.   Pulmonary:      Effort: Pulmonary effort is normal. No respiratory distress.      Breath sounds: Normal breath sounds.   Abdominal:      General: Abdomen is protuberant. Bowel sounds are normal.      Palpations: Abdomen is soft.      Tenderness: There is no abdominal tenderness in the epigastric area, periumbilical area, left upper quadrant and left lower quadrant. There is left CVA tenderness. There is no right CVA tenderness.      Hernia: No hernia is present.   Musculoskeletal:         General: Normal range of motion.      Cervical back: Normal range of motion and neck supple.   Skin:     General: Skin is warm and dry.   Neurological:      Mental Status: She is alert and oriented to person, place, and time. Mental status is at baseline.   Psychiatric:         Mood and Affect: Mood normal.         Behavior: Behavior normal.                Medical Decision Making:      Comorbidities that affect care:    Asthma, Atrial Fibrillation, Diabetes, Hypertension, Obesity    External Notes reviewed:    Previous Clinic Note: Patient last seen by her family doctor on April 17 for routine management of her chronic comorbid conditions      The following orders were placed and all results were independently analyzed by me:  Orders Placed This Encounter   Procedures    Urine Culture - Urine, Urine, Clean Catch    CT Abdomen Pelvis Without Contrast    [place] Draw    Comprehensive Metabolic Panel    Lipase    Single High Sensitivity Troponin T    Urinalysis With Microscopic If Indicated (No Culture) - Urine, Clean Catch    CBC Auto Differential    Urinalysis, Microscopic Only - Urine, Clean Catch    NPO Diet NPO Type: Strict NPO    Undress & Gown    Continuous Pulse Oximetry    Vital Signs    Oxygen Therapy- Nasal Cannula; Titrate 1-6 LPM Per SpO2; 90 - 95%    ECG 12 Lead ED Triage Standing Order; Abdominal Pain  (Upper)    Insert Peripheral IV    CBC & Differential    Green Top (Gel)    Lavender Top    Gold Top - SST    Light Blue Top    Extra Tubes    Gray Top       Medications Given in the Emergency Department:  Medications   sodium chloride 0.9 % flush 10 mL (10 mL Intravenous Given 7/7/24 0248)   cefTRIAXone (ROCEPHIN) 2,000 mg in sodium chloride 0.9 % 100 mL IVPB-VTB (has no administration in time range)   metoclopramide (REGLAN) injection 10 mg (has no administration in time range)   morphine injection 4 mg (4 mg Intravenous Given 7/7/24 0247)   ondansetron (ZOFRAN) injection 4 mg (4 mg Intravenous Given 7/7/24 0246)        ED Course:    ED Course as of 07/07/24 0418   Sun Jul 07, 2024 0345 CT Abdomen Pelvis Without Contrast  Ovarian cystic structure.  Nonemergent ultrasound needed.  No other findings [DS]   0416 Patient states she has taken all antibiotics but just takes Benadryl due to her food dye color allergy and has been fine in the past.  She specifically said yes she has taken Keflex this before without a reaction [DS]      ED Course User Index  [DS] Deann Yeh APRN       Labs:    Lab Results (last 24 hours)       Procedure Component Value Units Date/Time    CBC & Differential [[id number]]  (Abnormal) Collected: 07/07/24 0107    Specimen: Blood Updated: 07/07/24 0115    Narrative:      The following orders were created for panel order CBC & Differential.  Procedure                               Abnormality         Status                     ---------                               -----------         ------                     CBC Auto Differential[[id number]]        Abnormal            Final result                 Please view results for these tests on the individual orders.    Comprehensive Metabolic Panel [[id number]]  (Abnormal) Collected: 07/07/24 0107    Specimen: Blood Updated: 07/07/24 0133     Glucose 125 mg/dL      BUN 7 mg/dL      Creatinine 0.78 mg/dL      Sodium 140 mmol/L      Potassium 3.7  mmol/L      Chloride 105 mmol/L      CO2 25.0 mmol/L      Calcium 8.7 mg/dL      Total Protein 7.0 g/dL      Albumin 3.7 g/dL      ALT (SGPT) 14 U/L      AST (SGOT) 18 U/L      Alkaline Phosphatase 99 U/L      Total Bilirubin 0.3 mg/dL      Globulin 3.3 gm/dL      A/G Ratio 1.1 g/dL      BUN/Creatinine Ratio 9.0     Anion Gap 10.0 mmol/L      eGFR 92.1 mL/min/1.73     Narrative:      GFR Normal >60  Chronic Kidney Disease <60  Kidney Failure <15      Lipase [[id number]]  (Normal) Collected: 07/07/24 0107    Specimen: Blood Updated: 07/07/24 0133     Lipase 26 U/L     Single High Sensitivity Troponin T [[id number]]  (Normal) Collected: 07/07/24 0107    Specimen: Blood Updated: 07/07/24 0133     HS Troponin T 9 ng/L     Narrative:      High Sensitive Troponin T Reference Range:  <14.0 ng/L- Negative Female for AMI  <22.0 ng/L- Negative Male for AMI  >=14 - Abnormal Female indicating possible myocardial injury.  >=22 - Abnormal Male indicating possible myocardial injury.   Clinicians would have to utilize clinical acumen, EKG, Troponin, and serial changes to determine if it is an Acute Myocardial Infarction or myocardial injury due to an underlying chronic condition.         CBC Auto Differential [[id number]]  (Abnormal) Collected: 07/07/24 0107    Specimen: Blood Updated: 07/07/24 0115     WBC 8.35 10*3/mm3      RBC 4.59 10*6/mm3      Hemoglobin 12.4 g/dL      Hematocrit 39.5 %      MCV 86.1 fL      MCH 27.0 pg      MCHC 31.4 g/dL      RDW 15.3 %      RDW-SD 47.8 fl      MPV 10.0 fL      Platelets 376 10*3/mm3      Neutrophil % 63.2 %      Lymphocyte % 27.8 %      Monocyte % 5.6 %      Eosinophil % 2.3 %      Basophil % 0.6 %      Immature Grans % 0.5 %      Neutrophils, Absolute 5.28 10*3/mm3      Lymphocytes, Absolute 2.32 10*3/mm3      Monocytes, Absolute 0.47 10*3/mm3      Eosinophils, Absolute 0.19 10*3/mm3      Basophils, Absolute 0.05 10*3/mm3      Immature Grans, Absolute 0.04 10*3/mm3      nRBC 0.0 /100 WBC      Urinalysis With Microscopic If Indicated (No Culture) - Urine, Clean Catch [[id number]]  (Abnormal) Collected: 07/07/24 0320    Specimen: Urine, Clean Catch Updated: 07/07/24 0339     Color, UA Yellow     Appearance, UA Cloudy     pH, UA 6.0     Specific Gravity, UA 1.024     Glucose, UA Negative     Ketones, UA Trace     Bilirubin, UA Negative     Blood, UA Negative     Protein, UA Trace     Leuk Esterase, UA Small (1+)     Nitrite, UA Negative     Urobilinogen, UA 1.0 E.U./dL    Urinalysis, Microscopic Only - Urine, Clean Catch [[id number]]  (Abnormal) Collected: 07/07/24 0320    Specimen: Urine, Clean Catch Updated: 07/07/24 0347     RBC, UA 0-2 /HPF      WBC, UA 21-50 /HPF      Bacteria, UA 4+ /HPF      Squamous Epithelial Cells, UA 13-20 /HPF      Hyaline Casts, UA None Seen /LPF      Methodology Manual Light Microscopy    Urine Culture - Urine, Urine, Clean Catch [[id number]] Collected: 07/07/24 0320    Specimen: Urine, Clean Catch Updated: 07/07/24 0347             Imaging:    CT Abdomen Pelvis Without Contrast    Result Date: 7/7/2024  CT ABDOMEN PELVIS WO CONTRAST Date of Exam: 7/7/2024 3:01 AM EDT Indication: Left-sided abdominal and flank pain for the last 6 hours. Comparison: None available. Technique: Axial CT images were obtained of the abdomen and pelvis without the administration of contrast. Reconstructed coronal and sagittal images were also obtained. Automated exposure control and iterative construction methods were used. Findings: There is minimal scarring/atelectasis in the lung bases. There is atherosclerotic disease with no aortic aneurysm. Gallbladder is surgically absent. No biliary obstruction is seen. There is some fatty atrophy of the pancreas. The liver is enlarged with the right lobe measuring about 23.5 cm in length. The unenhanced solid abdominal organs are otherwise normal. No renal or ureteral stones are seen. There is no hydronephrosis. Urinary bladder is decompressed and not well  characterized. Uterus is surgically absent. There is a cyst just above the vaginal cuff measuring 6.1 x 4.1 cm. This is probably of ovarian origin. This can be assessed with nonemergent pelvic ultrasound. There is colonic diverticulosis without diverticulitis or colitis. The large bowel is normal in caliber. Tiny appendix is normal. No small bowel obstruction is seen. The stomach is normal. There is no adenopathy or free fluid.     1. No renal or ureteral stones. No hydronephrosis. 2. Hysterectomy and cholecystectomy. 3. Hepatomegaly. 4. Colonic diverticulosis without diverticulitis or colitis. No bowel obstruction. Normal appendix. 5. Cystic lesion just above the vaginal cuff measuring up to 6.1 cm. This is probably of ovarian origin. Nonemergent pelvic ultrasound recommended for follow-up. Electronically Signed: Ayo Parish MD  7/7/2024 3:30 AM EDT  Workstation ID: [id number]       Differential Diagnosis and Discussion:    Abdominal Pain: Based on the patient's signs and symptoms, I considered abdominal aortic aneurysm, small bowel obstruction, pancreatitis, acute cholecystitis, acute appendecitis, peptic ulcer disease, gastritis, colitis, endocrine disorders, irritable bowel syndrome and other differential diagnosis an etiology of the patient's abdominal pain.    All labs were reviewed and interpreted by me.  CT scan radiology impression was interpreted by me.    MDM  Number of Diagnoses or Management Options  Generalized abdominal pain  Left flank pain  Urinary tract infection without hematuria, site unspecified  Diagnosis management comments: Based on the results of the patient´s urinalysis and urinary complaints, signs, symptoms, and diagnostic testing is consistent with a urinary tract infection. Patient is resting comfortably, is alert, and is in no distress. The repeat examination is unremarkable and benign. The patient has no signs of urosepsis. The patient was started on antibiotics in the emergency  department and will be discharged with antibiotics as an outpatient. The patient was counseled to return to the ER for fever >100.5, intractable pain or vomiting, or any other concerns that the may have. The patient has expressed a clear and thorough understanding and agreed to follow up as instructed.       Amount and/or Complexity of Data Reviewed  Clinical lab tests: reviewed and ordered  Tests in the radiology section of CPT®: reviewed and ordered  Tests in the medicine section of CPT®: reviewed and ordered    Risk of Complications, Morbidity, and/or Mortality  Presenting problems: moderate  Diagnostic procedures: moderate  Management options: low    Patient Progress  Patient progress: stable           Patient Care Considerations:    NARCOTICS: I considered prescribing opiate pain medication as an outpatient, however patient had no emergent findings on CT and no signs of obstructive uropathy or pyelonephritis      Consultants/Shared Management Plan:    None    Social Determinants of Health:    Patient is independent, reliable, and has access to care.       Disposition and Care Coordination:    Discharged: I considered escalation of care by admitting this patient to the hospital, however testing was unremarkable and nothing that would require admission or transfer    I have explained the patient´s condition, diagnoses and treatment plan based on the information available to me at this time. I have answered questions and addressed any concerns. The patient has a good  understanding of the patient´s diagnosis, condition, and treatment plan as can be expected at this point. The vital signs have been stable. The patient´s condition is stable and appropriate for discharge from the emergency department.      The patient will pursue further outpatient evaluation with the primary care physician or other designated or consulting physician as outlined in the discharge instructions. They are agreeable to this plan of care  and follow-up instructions have been explained in detail. The patient has received these instructions in written format and has expressed an understanding of the discharge instructions. The patient is aware that any significant change in condition or worsening of symptoms should prompt an immediate return to this or the closest emergency department or call to 911.  I have explained discharge medications and the need for follow up with the patient/caretakers. This was also printed in the discharge instructions. Patient was discharged with the following medications and follow up:      Medication List        New Prescriptions      cephalexin 500 MG capsule  Commonly known as: KEFLEX  Take 1 capsule by mouth 4 (Four) Times a Day for 7 days.     diphenhydrAMINE 25 mg capsule  Commonly known as: BENADRYL  Take 1 capsule by mouth Every 6 (Six) Hours for 7 days.  Replaces: diphenhydrAMINE 25 MG tablet     promethazine 25 MG tablet  Commonly known as: PHENERGAN  Take 1 tablet by mouth Every 6 (Six) Hours As Needed for Vomiting or Nausea.            Changed      ondansetron ODT 4 MG disintegrating tablet  Commonly known as: ZOFRAN-ODT  Take 1 tablet by mouth Every 8 (Eight) Hours As Needed for Nausea or Vomiting.  What changed:   how much to take  how to take this            Stop      diphenhydrAMINE 25 MG tablet  Commonly known as: Benadryl Allergy  Replaced by: diphenhydrAMINE 25 mg capsule               Where to Get Your Medications        These medications were sent to [id number] - [place], [place] - [address] AT [facility] - [phone] [facility] [phone] FX  [address] [place] [address]      Phone: [phone]   cephalexin 500 MG capsule  diphenhydrAMINE 25 mg capsule  ondansetron ODT 4 MG disintegrating tablet  promethazine 25 MG tablet      Janki Clayton MD  [address] [facility] [address]  [facility] [id number] [phone]      As needed       Final diagnoses:    Left flank pain   Generalized abdominal pain   Urinary tract infection without hematuria, site unspecified        ED Disposition       ED Disposition   Discharge    Condition   Stable    Comment   --               This medical record created using voice recognition software.             Deann Yeh, APRN  07/07/24 0411

## 2024-07-07 NOTE — DISCHARGE INSTRUCTIONS
Medications as prescribed for symptomatic relief of nausea and vomiting as well as urinary tract infection.    Take your home pain medication.    Follow-up with your PCP    Return for new or worsening symptoms

## 2024-07-08 LAB
BACTERIA SPEC AEROBE CULT: NORMAL
QT INTERVAL: 406 MS
QTC INTERVAL: 471 MS

## 2024-07-26 ENCOUNTER — TELEPHONE (OUTPATIENT)
Dept: ORTHOPEDIC SURGERY | Facility: CLINIC | Age: 54
End: 2024-07-26

## 2024-07-26 ENCOUNTER — OFFICE VISIT (OUTPATIENT)
Dept: ORTHOPEDIC SURGERY | Facility: CLINIC | Age: 54
End: 2024-07-26
Payer: MEDICARE

## 2024-07-26 VITALS
HEIGHT: 67 IN | DIASTOLIC BLOOD PRESSURE: 91 MMHG | BODY MASS INDEX: 45.99 KG/M2 | OXYGEN SATURATION: 97 % | SYSTOLIC BLOOD PRESSURE: 181 MMHG | WEIGHT: 293 LBS | HEART RATE: 83 BPM

## 2024-07-26 DIAGNOSIS — M17.0 BILATERAL PRIMARY OSTEOARTHRITIS OF KNEE: ICD-10-CM

## 2024-07-26 DIAGNOSIS — M25.561 RIGHT KNEE PAIN, UNSPECIFIED CHRONICITY: Primary | ICD-10-CM

## 2024-07-26 DIAGNOSIS — M25.562 LEFT KNEE PAIN, UNSPECIFIED CHRONICITY: ICD-10-CM

## 2024-07-26 RX ORDER — LIDOCAINE HYDROCHLORIDE 10 MG/ML
5 INJECTION, SOLUTION INFILTRATION; PERINEURAL
Status: COMPLETED | OUTPATIENT
Start: 2024-07-26 | End: 2024-07-26

## 2024-07-26 RX ORDER — TRIAMCINOLONE ACETONIDE 40 MG/ML
40 INJECTION, SUSPENSION INTRA-ARTICULAR; INTRAMUSCULAR
Status: COMPLETED | OUTPATIENT
Start: 2024-07-26 | End: 2024-07-26

## 2024-07-26 RX ADMIN — LIDOCAINE HYDROCHLORIDE 5 ML: 10 INJECTION, SOLUTION INFILTRATION; PERINEURAL at 09:17

## 2024-07-26 RX ADMIN — TRIAMCINOLONE ACETONIDE 40 MG: 40 INJECTION, SUSPENSION INTRA-ARTICULAR; INTRAMUSCULAR at 09:17

## 2024-07-26 NOTE — PROGRESS NOTES
Chief Complaint  Follow-up of the Left Knee and Follow-up of the Right Knee    Subjective          History of Present Illness      Mina Rivera is a 54 y.o. female  presents to [facility] for     Patient presents for follow-up evaluation of bilateral knee pain and bilateral knee osteoarthritis.  She was last seen in April 16, 2024 and received bilateral steroid injections.  She states they helped until about 2 weeks ago this started causing her pain she points to the anterior medial and lateral knee as her areas of pain she states left is worse than the right she would like to avoid surgery she is requesting bilateral knee steroid injections today.  She is also asking about gel injections.      Allergies   Allergen Reactions    Bee Venom Anaphylaxis    Fd&C Yellow #5 (Tartrazine) Anaphylaxis     Patient states that she tolerates Keflex and Diflucan well by taking over-the-counter Benadryl along with the medications.    Neeta Anaphylaxis and Other (See Comments)    Iodinated Contrast Media Anaphylaxis, Itching and Other (See Comments)    Iodine Anaphylaxis    Meloxicam Shortness Of Breath and Swelling    Red Dye Shortness Of Breath    Methocarbamol Nausea And Vomiting    Oxycodone-Acetaminophen Itching, Swelling and Other (See Comments)    Famotidine Other (See Comments)     Pt experienced burning in vein and redness of arm above IV site    Food Color Red Other (See Comments)    Shellfish Allergy Other (See Comments)    Sumatriptan Swelling    Tramadol Other (See Comments)    Tramadol Hcl Other (See Comments)     ULCERS IN MOUTH    Adhesive Tape Rash        Social History     Socioeconomic History    Marital status:    Tobacco Use    Smoking status: Never    Smokeless tobacco: Never   Vaping Use    Vaping status: Never Used   Substance and Sexual Activity    Alcohol use: Never    Drug use: Never        REVIEW OF SYSTEMS    Constitutional: Awake alert and oriented x3, no acute  "distress, denies fevers, chills, weight loss  Respiratory: No respiratory distress  Vascular: Brisk cap refill, Intact distal pulses, No cyanosis, compartments soft with no signs or symptoms of compartment syndrome or DVT.   Cardiovascular: Denies chest pain, shortness of breath  Skin: Denies rashes, acute skin changes  Neurologic: Denies headache, loss of consciousness  MSK: Bilateral knee pain      Objective   Vital Signs:   BP (!) 181/91   Pulse 83   Ht 170.2 cm (67.01\")   Wt (!) 172 kg (380 lb)   SpO2 97%   BMI 59.50 kg/m²     Body mass index is 59.5 kg/m².    Physical Exam       Right knee- well healed scope scars. ROM -5 to 100 degrees. Medial joint line tenderness. Stable to varus/valgus stress. Stable to anterior/posterior drawer. Negative Lachman's. Negative Silas's. Positive EHL, FHL, GS and TA. Sensation intact to all 5 nerves of the foot. Positive pulses.      Left knee- ROM -5 to 105 degrees. Medial joint line tenderness. Stable to varus/valgus stress. Stable to anterior/posterior drawer. Negative Lachman's. Negative Silas's. Positive EHL, FHL, GS and TA. Sensation intact to all 5 nerves of the foot. Positive pulses.       Large Joint: R knee  Date/Time: 7/26/2024 9:17 AM  Consent given by: patient  Site marked: site marked  Timeout: Immediately prior to procedure a time out was called to verify the correct patient, procedure, equipment, support staff and site/side marked as required   Supporting Documentation  Indications: pain   Procedure Details  Location: knee - R knee  Preparation: Patient was prepped and draped in the usual sterile fashion  Needle gauge: 21g.  Medications administered: 5 mL lidocaine 1 %; 40 mg triamcinolone acetonide 40 MG/ML  Patient tolerance: patient tolerated the procedure well with no immediate complications      Large Joint: L knee  Date/Time: 7/26/2024 9:17 AM  Consent given by: patient  Site marked: site marked  Timeout: Immediately prior to procedure a time " out was called to verify the correct patient, procedure, equipment, support staff and site/side marked as required   Supporting Documentation  Indications: pain   Procedure Details  Location: knee - L knee  Preparation: Patient was prepped and draped in the usual sterile fashion  Needle gauge: 21g.  Medications administered: 5 mL lidocaine 1 %; 40 mg triamcinolone acetonide 40 MG/ML  Patient tolerance: patient tolerated the procedure well with no immediate complications    This injection documentation was Scribed for EVY Dacosta by Aleah Weiner MA.  07/26/24   09:18 EDT    Imaging Results (Most Recent)       None             Result Review :   The following data was reviewed by: EVY Dacosta on 07/26/2024:               Assessment and Plan    Diagnoses and all orders for this visit:    1. Right knee pain, unspecified chronicity (Primary)    2. Left knee pain, unspecified chronicity    3. Bilateral primary osteoarthritis of knee        Discussed diagnosis and treatment options with the patient she elected to have bilateral knee steroid injections which she tolerated well follow-up in 3 months.  We are going to seek approval for bilateral Zilretta injections.  Also may consider viscosupplementation in the future    Call or return if worsening symptoms.    Follow Up   Return in about 3 months (around 10/26/2024) for Recheck.  Patient was given instructions and counseling regarding her condition or for health maintenance advice. Please see specific information pulled into the AVS if appropriate.       EMR Dragon/Transcription disclaimer:  Part of this note may be an electronic transcription/translation of spoken language to printed text using the Dragon Dictation System

## 2024-08-07 ENCOUNTER — TELEPHONE (OUTPATIENT)
Dept: ORTHOPEDIC SURGERY | Facility: CLINIC | Age: 54
End: 2024-08-07
Payer: MEDICARE

## 2024-08-07 NOTE — TELEPHONE ENCOUNTER
HUB OK TO RELAY MSG/APPT (8-7-24)    APPT: 11-1 AT 8:30 AM ANA PAULA KNEE ZILRETTA INJ WITH [facility]    LAST ANA PAULA KNEE STEROID INJ:  7-26    [employer]

## 2024-08-07 NOTE — TELEPHONE ENCOUNTER
----- Message from Isela YEE sent at 8/1/2024  2:27 PM EDT -----  No PA Required for bilateral knee Zilretta.  Okay to schedule when appropriate.

## 2024-09-19 DIAGNOSIS — M17.0 BILATERAL PRIMARY OSTEOARTHRITIS OF KNEE: ICD-10-CM

## 2024-09-19 DIAGNOSIS — M25.561 RIGHT KNEE PAIN, UNSPECIFIED CHRONICITY: ICD-10-CM

## 2024-09-19 DIAGNOSIS — M25.562 LEFT KNEE PAIN, UNSPECIFIED CHRONICITY: ICD-10-CM

## 2024-09-20 RX ORDER — DICLOFENAC SODIUM 75 MG/1
75 TABLET, DELAYED RELEASE ORAL 2 TIMES DAILY
Qty: 60 TABLET | Refills: 2 | Status: SHIPPED | OUTPATIENT
Start: 2024-09-20

## 2024-10-29 ENCOUNTER — TELEPHONE (OUTPATIENT)
Dept: ORTHOPEDIC SURGERY | Facility: CLINIC | Age: 54
End: 2024-10-29
Payer: MEDICARE

## 2024-10-29 NOTE — TELEPHONE ENCOUNTER
CALLED AND LVM TO THE REGARDING HER APPT.  I HAD TO CANCEL HER APPT BEC HER POLICY THRU KEVIN EXP 9-1-24.  [facility]

## 2024-12-03 ENCOUNTER — HOSPITAL ENCOUNTER (EMERGENCY)
Facility: HOSPITAL | Age: 54
Discharge: HOME | End: 2024-12-03
Attending: EMERGENCY MEDICINE | Admitting: EMERGENCY MEDICINE
Payer: MEDICARE

## 2024-12-03 ENCOUNTER — APPOINTMENT (OUTPATIENT)
Dept: GENERAL RADIOLOGY | Facility: HOSPITAL | Age: 54
End: 2024-12-03
Payer: MEDICARE

## 2024-12-03 VITALS
SYSTOLIC BLOOD PRESSURE: 156 MMHG | BODY MASS INDEX: 45.99 KG/M2 | RESPIRATION RATE: 20 BRPM | DIASTOLIC BLOOD PRESSURE: 86 MMHG | HEIGHT: 67 IN | WEIGHT: 293 LBS | OXYGEN SATURATION: 98 % | TEMPERATURE: 97.7 F | HEART RATE: 88 BPM

## 2024-12-03 DIAGNOSIS — J06.9 VIRAL URI: Primary | ICD-10-CM

## 2024-12-03 LAB
FLUAV SUBTYP SPEC NAA+PROBE: NOT DETECTED
FLUBV RNA ISLT QL NAA+PROBE: NOT DETECTED
RSV RNA NPH QL NAA+NON-PROBE: NOT DETECTED
S PYO AG THROAT QL: NEGATIVE
SARS-COV-2 RNA RESP QL NAA+PROBE: NOT DETECTED

## 2024-12-03 PROCEDURE — 87880 STREP A ASSAY W/OPTIC: CPT | Performed by: EMERGENCY MEDICINE

## 2024-12-03 PROCEDURE — 99283 EMERGENCY DEPT VISIT LOW MDM: CPT

## 2024-12-03 PROCEDURE — 25010000002 DEXAMETHASONE SODIUM PHOSPHATE 10 MG/ML SOLUTION

## 2024-12-03 PROCEDURE — 96372 THER/PROPH/DIAG INJ SC/IM: CPT

## 2024-12-03 PROCEDURE — 71045 X-RAY EXAM CHEST 1 VIEW: CPT

## 2024-12-03 PROCEDURE — 87637 SARSCOV2&INF A&B&RSV AMP PRB: CPT | Performed by: EMERGENCY MEDICINE

## 2024-12-03 PROCEDURE — 87081 CULTURE SCREEN ONLY: CPT | Performed by: EMERGENCY MEDICINE

## 2024-12-03 RX ORDER — DEXAMETHASONE SODIUM PHOSPHATE 10 MG/ML
10 INJECTION, SOLUTION INTRAMUSCULAR; INTRAVENOUS ONCE
Status: COMPLETED | OUTPATIENT
Start: 2024-12-03 | End: 2024-12-03

## 2024-12-03 RX ADMIN — DEXAMETHASONE SODIUM PHOSPHATE 10 MG: 10 INJECTION INTRAMUSCULAR; INTRAVENOUS at 21:40

## 2024-12-04 NOTE — ED PROVIDER NOTES
Time: 7:30 PM EST  Date of encounter:  12/3/2024  Independent Historian/Clinical History and Information was obtained by:   Patient    History is limited by: N/A    Chief Complaint: URI      History of Present Illness:  Patient is a 54 y.o. year old female who presents to the emergency department for evaluation of URI symptoms x 3 days.  Patient states she has been having head congestion, body aches, shortness of breath x 3 days.  Patient reports that she has been exposed to her pediatric niece who is currently hospitalized in [place] for respiratory illness.  States that she began feeling ill 2 days ago and has been resting at home.  Reports she has not drink much water.  Reports that she has a cough productive of clear phlegm.  Denies difficulty breathing, chest pain, leg swelling, rash, cyanosis, apnea.    Patient Care Team  Primary Care Provider: Janki Clayton MD    Past Medical History:     Allergies   Allergen Reactions    Bee Venom Anaphylaxis    Fd&C Yellow #5 (Tartrazine) Anaphylaxis     Patient states that she tolerates Keflex and Diflucan well by taking over-the-counter Benadryl along with the medications.    Neeta Anaphylaxis and Other (See Comments)    Iodinated Contrast Media Anaphylaxis, Itching and Other (See Comments)    Iodine Anaphylaxis    Meloxicam Shortness Of Breath and Swelling    Red Dye #40 (Allura Red) Shortness Of Breath    Methocarbamol Nausea And Vomiting    Oxycodone-Acetaminophen Itching, Swelling and Other (See Comments)    Famotidine Other (See Comments)     Pt experienced burning in vein and redness of arm above IV site    Food Color Red Other (See Comments)    Shellfish Allergy Other (See Comments)    Sumatriptan Swelling    Tramadol Other (See Comments)    Tramadol Hcl Other (See Comments)     ULCERS IN MOUTH    Adhesive Tape Rash     Past Medical History:   Diagnosis Date    A-fib     Asthma     Diabetes mellitus     Hypertension     Lymphedema     Migraine     Murmur      Sleep apnea      Past Surgical History:   Procedure Laterality Date    CHOLECYSTECTOMY      FOOT SURGERY      HYSTERECTOMY      INCONTINENCE SURGERY       No family history on file.    Home Medications:  Prior to Admission medications    Medication Sig Start Date End Date Taking? Authorizing Provider   albuterol sulfate  (90 Base) MCG/ACT inhaler INHALE 2 PUFFS INTO THE LUNGS BY MOUTH EVERY 6 HOURS AS NEEDED FOR WHEEZING    Ernestine Jackson MD   Blood Glucose Monitoring Suppl (OneTouch Verio Reflect) w/Device kit Daily. 9/19/23   Ernestine Jackson MD Breo Ellipta 100-25 MCG/ACT aerosol powder  Inhale 1 puff Daily. 12/30/23   Ernestine Jackson MD   cetirizine (zyrTEC) 10 MG tablet Take 1 tablet by mouth Daily. 8/15/23 8/14/24  Ernestine Jackson MD   diclofenac (VOLTAREN) 75 MG EC tablet TAKE 1 TABLET BY MOUTH TWICE DAILY 9/20/24   Ben Herzog MD   esomeprazole (nexIUM) 40 MG capsule Take 1 capsule by mouth Every Morning Before Breakfast.    Ernestine Jackson MD   HYDROcodone-acetaminophen (NORCO) 7.5-325 MG per tablet Take 1 tablet by mouth 4 (Four) Times a Day. 3/23/24   Ernestine Jackson MD   ipratropium-albuterol (DUO-NEB) 0.5-2.5 mg/3 ml nebulizer TAKE 1 AMPULE BY MOUTH VIA NEBULIZATION EVERY 6 HOURS AS NEEDED FOR WHEEZING 12/30/23   Ernestine Jackson MD   Lancets (OneTouch Delica Plus [id number]) misc USE THREE TIMES DAILY AS NEEDED 1/23/24   Ernestine Jackson MD   levothyroxine (SYNTHROID, LEVOTHROID) 50 MCG tablet Take 1 tablet by mouth Daily. 3/28/24   Ernestine Jackson MD   LORazepam (ATIVAN) 1 MG tablet TAKE 1/2 TO 1 TABLET BY MOUTH EVERY 6 HOURS AS NEEDED FOR ANXIETY. MAX DAILY AMOUNT: 4 MG. MAX DAILY AMOUNT: 4 MG 2/21/24   Ernestine Jackson MD   metFORMIN ER (GLUCOPHAGE-XR) 500 MG 24 hr tablet Take 1 tablet by mouth Daily With Breakfast.    Ernestine Jackson MD   ondansetron ODT (ZOFRAN-ODT) 4 MG disintegrating tablet Take 1 tablet by mouth  "Every 8 (Eight) Hours As Needed for Nausea or Vomiting. 7/7/24   Deann Yeh APRN   promethazine (PHENERGAN) 25 MG tablet Take 1 tablet by mouth Every 6 (Six) Hours As Needed for Vomiting or Nausea. 7/7/24   Deann Yeh APRN   Trintellix 20 MG tablet Take 1 tablet by mouth Daily.    Provider, Historical, MD        Social History:   Social History     Tobacco Use    Smoking status: Never    Smokeless tobacco: Never   Vaping Use    Vaping status: Never Used   Substance Use Topics    Alcohol use: Never    Drug use: Never         Review of Systems:  Review of Systems   Constitutional:  Positive for activity change, chills and fatigue. Negative for appetite change, diaphoresis and fever.   HENT:  Positive for congestion, ear pain, rhinorrhea, sinus pressure and sore throat. Negative for hearing loss, trouble swallowing and voice change.    Eyes:  Negative for photophobia and visual disturbance.   Respiratory:  Positive for cough. Negative for chest tightness, shortness of breath and wheezing.    Cardiovascular:  Negative for chest pain, palpitations and leg swelling.   Gastrointestinal:  Positive for nausea. Negative for abdominal pain, diarrhea and vomiting.   Genitourinary:  Negative for difficulty urinating and dysuria.   Musculoskeletal:  Positive for myalgias. Negative for arthralgias, back pain and neck pain.   Skin:  Negative for color change and rash.   Neurological:  Positive for headaches. Negative for dizziness and numbness.   Psychiatric/Behavioral:  Negative for agitation and confusion.         Physical Exam:  /86   Pulse 88   Temp 97.7 °F (36.5 °C) (Oral)   Resp 20   Ht 170.2 cm (67\")   Wt (!) 172 kg (379 lb 6.6 oz)   SpO2 98%   BMI 59.42 kg/m²     Physical Exam  Vitals and nursing note reviewed.   Constitutional:       General: She is not in acute distress.     Appearance: Normal appearance.   HENT:      Head: Normocephalic and atraumatic.      Right Ear: Ear canal and external ear normal. "      Left Ear: Ear canal and external ear normal.      Ears:      Comments: Bilateral TM serous effusions.  No erythema or bulging, no purulence noted.  No mastoid tenderness, no tenderness with manipulation of pinna or tragus.     Nose: Congestion present.      Mouth/Throat:      Mouth: Mucous membranes are moist.      Pharynx: Posterior oropharyngeal erythema present. No oropharyngeal exudate.   Eyes:      Extraocular Movements: Extraocular movements intact.      Conjunctiva/sclera: Conjunctivae normal.      Pupils: Pupils are equal, round, and reactive to light.   Cardiovascular:      Rate and Rhythm: Normal rate and regular rhythm.      Pulses: Normal pulses.      Heart sounds: Normal heart sounds.   Pulmonary:      Effort: Pulmonary effort is normal. No respiratory distress.      Breath sounds: Normal breath sounds. No wheezing, rhonchi or rales.   Abdominal:      General: There is no distension.      Palpations: Abdomen is soft.   Musculoskeletal:         General: Normal range of motion.      Cervical back: Neck supple.   Skin:     General: Skin is warm and dry.      Capillary Refill: Capillary refill takes less than 2 seconds.   Neurological:      General: No focal deficit present.      Mental Status: She is alert and oriented to person, place, and time.   Psychiatric:         Mood and Affect: Mood normal.         Behavior: Behavior normal.                  Procedures:  Procedures      Medical Decision Making:      Comorbidities that affect care:    Hypertension, Obesity    External Notes reviewed:    None      The following orders were placed and all results were independently analyzed by me:  Orders Placed This Encounter   Procedures    COVID PRE-OP / PRE-PROCEDURE SCREENING ORDER (NO ISOLATION) - Swab, Nasopharynx    COVID-19, FLU A/B, RSV PCR 1 HR TAT - Swab, Nasopharynx    Rapid Strep A Screen - Swab, Throat    Beta Strep Culture, Throat - Swab, Throat    XR Chest 1 View       Medications Given in the  Emergency Department:  Medications   dexAMETHasone sodium phosphate injection 10 mg (10 mg Intramuscular Given 12/3/24 2140)        ED Course:    ED Course as of 12/03/24 2143   Tue Dec 03, 2024   1931 --- PROVIDER IN TRIAGE NOTE ---    The patient was evaluated by Doni ortega in triage. Orders were placed and the patient is currently awaiting disposition.    [AJ]      ED Course User Index  [AJ] Doni Garcia PA-C       Labs:    Lab Results (last 24 hours)       Procedure Component Value Units Date/Time    COVID PRE-OP / PRE-PROCEDURE SCREENING ORDER (NO ISOLATION) - Swab, Nasopharynx [[id number]]  (Normal) Collected: 12/03/24 1933    Specimen: Swab from Nasopharynx Updated: 12/03/24 2020    Narrative:      The following orders were created for panel order COVID PRE-OP / PRE-PROCEDURE SCREENING ORDER (NO ISOLATION) - Swab, Nasopharynx.  Procedure                               Abnormality         Status                     ---------                               -----------         ------                     COVID-19, FLU A/B, RSV P...[[id number]]  Normal              Final result                 Please view results for these tests on the individual orders.    COVID-19, FLU A/B, RSV PCR 1 HR TAT - Swab, Nasopharynx [[id number]]  (Normal) Collected: 12/03/24 1933    Specimen: Swab from Nasopharynx Updated: 12/03/24 2020     COVID19 Not Detected     Influenza A PCR Not Detected     Influenza B PCR Not Detected     RSV, PCR Not Detected    Narrative:      Fact sheet for providers: https://www.fda.gov/media/757369/download    Fact sheet for patients: https://www.fda.gov/media/353171/download    Test performed by PCR.    Rapid Strep A Screen - Swab, Throat [[id number]]  (Normal) Collected: 12/03/24 1933    Specimen: Swab from Throat Updated: 12/03/24 2001     Strep A Ag Negative    Beta Strep Culture, Throat - Swab, Throat [[id number]] Collected: 12/03/24 1933    Specimen: Swab from Throat Updated: 12/03/24  2001             Imaging:    XR Chest 1 View    Result Date: 12/3/2024  XR CHEST 1 VW Date of Exam: 12/3/2024 7:54 PM EST Indication: cough Comparison: Chest radiograph 1/13/2024 Findings: Mediastinum: Cardiomediastinal silhouette appears unchanged and normal in size Lungs: The lungs appear clear without focal consolidation appreciated Pleura: No pleural effusion or pneumothorax. Bones and soft tissues: No acute osseous abnormality.     Impression: No radiographic evidence of acute cardiopulmonary abnormality. Electronically Signed: Rustam Bailey  12/3/2024 8:24 PM EST  Workstation ID: [id number]       Differential Diagnosis and Discussion:    Cough: Differential diagnosis includes but is not limited to pneumonia, acute bronchitis, upper respiratory infection, ACE inhibitor use, allergic reaction, epiglottitis, seasonal allergies, chemical irritants, exercise-induced asthma, viral syndrome.  Viral syndrome: Differential diagnosis includes but is not limited to influenza, common cold, COVID-19, RSV, adenovirus, enteroviruses, herpes virus, hepatitis virus, measles, mumps, rubella, dengue fever, and possible bacterial infection.    All labs were reviewed and interpreted by me.  All X-rays impressions were independently interpreted by me.    MDM     Amount and/or Complexity of Data Reviewed  Clinical lab tests: ordered and reviewed  Tests in the radiology section of CPT®: ordered and reviewed    Risk of Complications, Morbidity, and/or Mortality  Presenting problems: low  Diagnostic procedures: low  Management options: low    Patient Progress  Patient progress: stable                       Patient Care Considerations:    ANTIBIOTICS: I considered prescribing antibiotics as an outpatient however no bacterial focus of infection was found.      Consultants/Shared Management Plan:    None    Social Determinants of Health:    Patient is independent, reliable, and has access to care.       Disposition and Care  Coordination:    Discharged: The patient is suitable and stable for discharge with no need for consideration of admission.    I have explained the patient´s condition, diagnoses and treatment plan based on the information available to me at this time. I have answered questions and addressed any concerns. The patient has a good  understanding of the patient´s diagnosis, condition, and treatment plan as can be expected at this point. The vital signs have been stable. The patient´s condition is stable and appropriate for discharge from the emergency department.      The patient will pursue further outpatient evaluation with the primary care physician or other designated or consulting physician as outlined in the discharge instructions. They are agreeable to this plan of care and follow-up instructions have been explained in detail. The patient has received these instructions in written format and has expressed an understanding of the discharge instructions. The patient is aware that any significant change in condition or worsening of symptoms should prompt an immediate return to this or the closest emergency department or call to 911.  I have explained discharge medications and the need for follow up with the patient/caretakers. This was also printed in the discharge instructions. Patient was discharged with the following medications and follow up:      Medication List      No changes were made to your prescriptions during this visit.      No follow-up provider specified.     Final diagnoses:   Viral URI        ED Disposition       ED Disposition   Discharge    Condition   Stable    Comment   --               This medical record created using voice recognition software.             Dulce Durand PA-C  12/03/24 7091

## 2024-12-04 NOTE — DISCHARGE INSTRUCTIONS
You were evaluated in the emergency department today.  Your initial testing is negative for strep, COVID/flu/RSV.  You likely have a viral upper respiratory infection.  Rest and drink plenty of fluids, return to the ED if you have any worsening symptoms.

## 2024-12-05 LAB — BACTERIA SPEC AEROBE CULT: NORMAL

## 2024-12-14 DIAGNOSIS — M25.562 LEFT KNEE PAIN, UNSPECIFIED CHRONICITY: ICD-10-CM

## 2024-12-14 DIAGNOSIS — M17.0 BILATERAL PRIMARY OSTEOARTHRITIS OF KNEE: ICD-10-CM

## 2024-12-14 DIAGNOSIS — M25.561 RIGHT KNEE PAIN, UNSPECIFIED CHRONICITY: ICD-10-CM

## 2024-12-16 RX ORDER — DICLOFENAC SODIUM 75 MG/1
75 TABLET, DELAYED RELEASE ORAL 2 TIMES DAILY
Qty: 60 TABLET | Refills: 2 | Status: SHIPPED | OUTPATIENT
Start: 2024-12-16

## 2025-01-18 ENCOUNTER — HOSPITAL ENCOUNTER (INPATIENT)
Facility: HOSPITAL | Age: 55
LOS: 2 days | Discharge: HOME | DRG: 392 | End: 2025-01-20
Attending: EMERGENCY MEDICINE | Admitting: FAMILY MEDICINE
Payer: MEDICARE

## 2025-01-18 ENCOUNTER — APPOINTMENT (OUTPATIENT)
Dept: CT IMAGING | Facility: HOSPITAL | Age: 55
DRG: 392 | End: 2025-01-18
Payer: MEDICARE

## 2025-01-18 DIAGNOSIS — K58.8 OTHER IRRITABLE BOWEL SYNDROME: ICD-10-CM

## 2025-01-18 DIAGNOSIS — K57.92 DIVERTICULITIS: Primary | ICD-10-CM

## 2025-01-18 PROBLEM — E11.9 TYPE 2 DIABETES MELLITUS: Status: ACTIVE | Noted: 2025-01-18

## 2025-01-18 PROBLEM — I82.419 DVT OF DEEP FEMORAL VEIN: Status: ACTIVE | Noted: 2017-01-31

## 2025-01-18 PROBLEM — I10 ESSENTIAL HYPERTENSION: Status: ACTIVE | Noted: 2025-01-18

## 2025-01-18 PROBLEM — G47.33 OSA (OBSTRUCTIVE SLEEP APNEA): Status: ACTIVE | Noted: 2025-01-18

## 2025-01-18 PROBLEM — K58.9 IBS (IRRITABLE BOWEL SYNDROME): Status: ACTIVE | Noted: 2025-01-18

## 2025-01-18 PROBLEM — E11.42 DIABETIC PERIPHERAL NEUROPATHY: Status: ACTIVE | Noted: 2025-01-18

## 2025-01-18 LAB
ALBUMIN SERPL-MCNC: 3.6 G/DL (ref 3.5–5.2)
ALBUMIN/GLOB SERPL: 1 G/DL
ALP SERPL-CCNC: 115 U/L (ref 39–117)
ALT SERPL W P-5'-P-CCNC: 18 U/L (ref 1–33)
ANION GAP SERPL CALCULATED.3IONS-SCNC: 11.7 MMOL/L (ref 5–15)
AST SERPL-CCNC: 24 U/L (ref 1–32)
BACTERIA UR QL AUTO: ABNORMAL /HPF
BASOPHILS # BLD AUTO: 0.04 10*3/MM3 (ref 0–0.2)
BASOPHILS NFR BLD AUTO: 0.3 % (ref 0–1.5)
BILIRUB SERPL-MCNC: 0.5 MG/DL (ref 0–1.2)
BILIRUB UR QL STRIP: NEGATIVE
BUN SERPL-MCNC: 11 MG/DL (ref 6–20)
BUN/CREAT SERPL: 14.1 (ref 7–25)
CALCIUM SPEC-SCNC: 8.8 MG/DL (ref 8.6–10.5)
CHLORIDE SERPL-SCNC: 104 MMOL/L (ref 98–107)
CLARITY UR: ABNORMAL
CO2 SERPL-SCNC: 24.3 MMOL/L (ref 22–29)
COLOR UR: YELLOW
CREAT SERPL-MCNC: 0.78 MG/DL (ref 0.57–1)
D-LACTATE SERPL-SCNC: 1.8 MMOL/L (ref 0.5–2)
DEPRECATED RDW RBC AUTO: 47.7 FL (ref 37–54)
EGFRCR SERPLBLD CKD-EPI 2021: 91.5 ML/MIN/1.73
EOSINOPHIL # BLD AUTO: 0.18 10*3/MM3 (ref 0–0.4)
EOSINOPHIL NFR BLD AUTO: 1.6 % (ref 0.3–6.2)
ERYTHROCYTE [DISTWIDTH] IN BLOOD BY AUTOMATED COUNT: 15.7 % (ref 12.3–15.4)
GLOBULIN UR ELPH-MCNC: 3.7 GM/DL
GLUCOSE BLDC GLUCOMTR-MCNC: 100 MG/DL (ref 70–99)
GLUCOSE BLDC GLUCOMTR-MCNC: 119 MG/DL (ref 70–99)
GLUCOSE BLDC GLUCOMTR-MCNC: 136 MG/DL (ref 70–99)
GLUCOSE BLDC GLUCOMTR-MCNC: 159 MG/DL (ref 70–99)
GLUCOSE SERPL-MCNC: 120 MG/DL (ref 65–99)
GLUCOSE UR STRIP-MCNC: NEGATIVE MG/DL
HCT VFR BLD AUTO: 39.1 % (ref 34–46.6)
HGB BLD-MCNC: 12.5 G/DL (ref 12–15.9)
HGB UR QL STRIP.AUTO: ABNORMAL
HOLD SPECIMEN: NORMAL
HOLD SPECIMEN: NORMAL
HYALINE CASTS UR QL AUTO: ABNORMAL /LPF
IMM GRANULOCYTES # BLD AUTO: 0.06 10*3/MM3 (ref 0–0.05)
IMM GRANULOCYTES NFR BLD AUTO: 0.5 % (ref 0–0.5)
KETONES UR QL STRIP: NEGATIVE
LEUKOCYTE ESTERASE UR QL STRIP.AUTO: ABNORMAL
LIPASE SERPL-CCNC: 31 U/L (ref 13–60)
LYMPHOCYTES # BLD AUTO: 1.28 10*3/MM3 (ref 0.7–3.1)
LYMPHOCYTES NFR BLD AUTO: 11.1 % (ref 19.6–45.3)
MCH RBC QN AUTO: 27.1 PG (ref 26.6–33)
MCHC RBC AUTO-ENTMCNC: 32 G/DL (ref 31.5–35.7)
MCV RBC AUTO: 84.8 FL (ref 79–97)
MONOCYTES # BLD AUTO: 0.53 10*3/MM3 (ref 0.1–0.9)
MONOCYTES NFR BLD AUTO: 4.6 % (ref 5–12)
NEUTROPHILS NFR BLD AUTO: 81.9 % (ref 42.7–76)
NEUTROPHILS NFR BLD AUTO: 9.4 10*3/MM3 (ref 1.7–7)
NITRITE UR QL STRIP: NEGATIVE
NRBC BLD AUTO-RTO: 0 /100 WBC (ref 0–0.2)
PH UR STRIP.AUTO: 5.5 [PH] (ref 5–8)
PLATELET # BLD AUTO: 393 10*3/MM3 (ref 140–450)
PMV BLD AUTO: 10.2 FL (ref 6–12)
POTASSIUM SERPL-SCNC: 3.5 MMOL/L (ref 3.5–5.2)
PROT SERPL-MCNC: 7.3 G/DL (ref 6–8.5)
PROT UR QL STRIP: ABNORMAL
RBC # BLD AUTO: 4.61 10*6/MM3 (ref 3.77–5.28)
RBC # UR STRIP: ABNORMAL /HPF
REF LAB TEST METHOD: ABNORMAL
SODIUM SERPL-SCNC: 140 MMOL/L (ref 136–145)
SP GR UR STRIP: 1.02 (ref 1–1.03)
SQUAMOUS #/AREA URNS HPF: ABNORMAL /HPF
UROBILINOGEN UR QL STRIP: ABNORMAL
WBC # UR STRIP: ABNORMAL /HPF
WBC NRBC COR # BLD AUTO: 11.49 10*3/MM3 (ref 3.4–10.8)
WHOLE BLOOD HOLD COAG: NORMAL
WHOLE BLOOD HOLD SPECIMEN: NORMAL

## 2025-01-18 PROCEDURE — 25810000003 LACTATED RINGERS PER 1000 ML: Performed by: FAMILY MEDICINE

## 2025-01-18 PROCEDURE — 85025 COMPLETE CBC W/AUTO DIFF WBC: CPT | Performed by: EMERGENCY MEDICINE

## 2025-01-18 PROCEDURE — 74176 CT ABD & PELVIS W/O CONTRAST: CPT

## 2025-01-18 PROCEDURE — 25010000002 PIPERACILLIN SOD-TAZOBACTAM PER 1 G: Performed by: FAMILY MEDICINE

## 2025-01-18 PROCEDURE — 83605 ASSAY OF LACTIC ACID: CPT | Performed by: EMERGENCY MEDICINE

## 2025-01-18 PROCEDURE — 25010000002 ENOXAPARIN PER 10 MG: Performed by: FAMILY MEDICINE

## 2025-01-18 PROCEDURE — 94799 UNLISTED PULMONARY SVC/PX: CPT

## 2025-01-18 PROCEDURE — 25010000002 PIPERACILLIN SOD-TAZOBACTAM PER 1 G

## 2025-01-18 PROCEDURE — 82948 REAGENT STRIP/BLOOD GLUCOSE: CPT

## 2025-01-18 PROCEDURE — 99285 EMERGENCY DEPT VISIT HI MDM: CPT

## 2025-01-18 PROCEDURE — 81001 URINALYSIS AUTO W/SCOPE: CPT | Performed by: EMERGENCY MEDICINE

## 2025-01-18 PROCEDURE — 83690 ASSAY OF LIPASE: CPT | Performed by: EMERGENCY MEDICINE

## 2025-01-18 PROCEDURE — 36415 COLL VENOUS BLD VENIPUNCTURE: CPT | Performed by: EMERGENCY MEDICINE

## 2025-01-18 PROCEDURE — 82948 REAGENT STRIP/BLOOD GLUCOSE: CPT | Performed by: FAMILY MEDICINE

## 2025-01-18 PROCEDURE — 80053 COMPREHEN METABOLIC PANEL: CPT | Performed by: EMERGENCY MEDICINE

## 2025-01-18 PROCEDURE — 99223 1ST HOSP IP/OBS HIGH 75: CPT | Performed by: FAMILY MEDICINE

## 2025-01-18 PROCEDURE — 25010000002 HYDROMORPHONE 1 MG/ML SOLUTION: Performed by: FAMILY MEDICINE

## 2025-01-18 PROCEDURE — 94761 N-INVAS EAR/PLS OXIMETRY MLT: CPT

## 2025-01-18 PROCEDURE — 87040 BLOOD CULTURE FOR BACTERIA: CPT | Performed by: FAMILY MEDICINE

## 2025-01-18 PROCEDURE — 25010000002 HYDROMORPHONE 1 MG/ML SOLUTION: Performed by: EMERGENCY MEDICINE

## 2025-01-18 PROCEDURE — 94660 CPAP INITIATION&MGMT: CPT

## 2025-01-18 RX ORDER — LORAZEPAM 1 MG/1
1 TABLET ORAL 2 TIMES DAILY PRN
COMMUNITY

## 2025-01-18 RX ORDER — NICOTINE POLACRILEX 4 MG
15 LOZENGE BUCCAL
Status: DISCONTINUED | OUTPATIENT
Start: 2025-01-18 | End: 2025-01-20 | Stop reason: HOSPADM

## 2025-01-18 RX ORDER — IPRATROPIUM BROMIDE AND ALBUTEROL SULFATE 2.5; .5 MG/3ML; MG/3ML
3 SOLUTION RESPIRATORY (INHALATION) EVERY 4 HOURS PRN
Status: DISCONTINUED | OUTPATIENT
Start: 2025-01-18 | End: 2025-01-20 | Stop reason: HOSPADM

## 2025-01-18 RX ORDER — INSULIN LISPRO 100 [IU]/ML
2-9 INJECTION, SOLUTION INTRAVENOUS; SUBCUTANEOUS
Status: DISCONTINUED | OUTPATIENT
Start: 2025-01-18 | End: 2025-01-20 | Stop reason: HOSPADM

## 2025-01-18 RX ORDER — DEXTROSE MONOHYDRATE 25 G/50ML
25 INJECTION, SOLUTION INTRAVENOUS
Status: DISCONTINUED | OUTPATIENT
Start: 2025-01-18 | End: 2025-01-20 | Stop reason: HOSPADM

## 2025-01-18 RX ORDER — FUROSEMIDE 20 MG/1
20 TABLET ORAL
Status: DISCONTINUED | OUTPATIENT
Start: 2025-01-18 | End: 2025-01-20 | Stop reason: HOSPADM

## 2025-01-18 RX ORDER — LEVOTHYROXINE SODIUM 50 UG/1
50 TABLET ORAL
Status: DISCONTINUED | OUTPATIENT
Start: 2025-01-18 | End: 2025-01-20 | Stop reason: HOSPADM

## 2025-01-18 RX ORDER — TRAZODONE HYDROCHLORIDE 50 MG/1
50-100 TABLET, FILM COATED ORAL NIGHTLY PRN
COMMUNITY

## 2025-01-18 RX ORDER — SODIUM CHLORIDE 0.9 % (FLUSH) 0.9 %
10 SYRINGE (ML) INJECTION EVERY 12 HOURS SCHEDULED
Status: DISCONTINUED | OUTPATIENT
Start: 2025-01-18 | End: 2025-01-20 | Stop reason: HOSPADM

## 2025-01-18 RX ORDER — NYSTATIN 100000 U/G
1 CREAM TOPICAL 2 TIMES DAILY PRN
COMMUNITY

## 2025-01-18 RX ORDER — NYSTATIN 100000 [USP'U]/G
1 POWDER TOPICAL 4 TIMES DAILY PRN
COMMUNITY

## 2025-01-18 RX ORDER — FUROSEMIDE 20 MG/1
1 TABLET ORAL 2 TIMES DAILY
COMMUNITY
Start: 2024-10-31

## 2025-01-18 RX ORDER — MORPHINE SULFATE 2 MG/ML
2 INJECTION, SOLUTION INTRAMUSCULAR; INTRAVENOUS
Status: DISCONTINUED | OUTPATIENT
Start: 2025-01-18 | End: 2025-01-20 | Stop reason: HOSPADM

## 2025-01-18 RX ORDER — SODIUM CHLORIDE, SODIUM LACTATE, POTASSIUM CHLORIDE, CALCIUM CHLORIDE 600; 310; 30; 20 MG/100ML; MG/100ML; MG/100ML; MG/100ML
100 INJECTION, SOLUTION INTRAVENOUS CONTINUOUS
Status: DISCONTINUED | OUTPATIENT
Start: 2025-01-18 | End: 2025-01-19

## 2025-01-18 RX ORDER — HYDROXYZINE HYDROCHLORIDE 25 MG/1
25-50 TABLET, FILM COATED ORAL EVERY 8 HOURS PRN
COMMUNITY

## 2025-01-18 RX ORDER — SODIUM CHLORIDE, SODIUM LACTATE, POTASSIUM CHLORIDE, CALCIUM CHLORIDE 600; 310; 30; 20 MG/100ML; MG/100ML; MG/100ML; MG/100ML
100 INJECTION, SOLUTION INTRAVENOUS CONTINUOUS
Status: DISCONTINUED | OUTPATIENT
Start: 2025-01-18 | End: 2025-01-18

## 2025-01-18 RX ORDER — ENOXAPARIN SODIUM 100 MG/ML
60 INJECTION SUBCUTANEOUS EVERY 12 HOURS
Status: DISCONTINUED | OUTPATIENT
Start: 2025-01-18 | End: 2025-01-20 | Stop reason: HOSPADM

## 2025-01-18 RX ORDER — IPRATROPIUM BROMIDE AND ALBUTEROL SULFATE 2.5; .5 MG/3ML; MG/3ML
3 SOLUTION RESPIRATORY (INHALATION) EVERY 6 HOURS PRN
COMMUNITY

## 2025-01-18 RX ORDER — SODIUM CHLORIDE 9 MG/ML
40 INJECTION, SOLUTION INTRAVENOUS AS NEEDED
Status: DISCONTINUED | OUTPATIENT
Start: 2025-01-18 | End: 2025-01-20 | Stop reason: HOSPADM

## 2025-01-18 RX ORDER — ONDANSETRON 2 MG/ML
4 INJECTION INTRAMUSCULAR; INTRAVENOUS EVERY 6 HOURS PRN
Status: DISCONTINUED | OUTPATIENT
Start: 2025-01-18 | End: 2025-01-20 | Stop reason: HOSPADM

## 2025-01-18 RX ORDER — IBUPROFEN 600 MG/1
1 TABLET ORAL
Status: DISCONTINUED | OUTPATIENT
Start: 2025-01-18 | End: 2025-01-20 | Stop reason: HOSPADM

## 2025-01-18 RX ORDER — SODIUM CHLORIDE 0.9 % (FLUSH) 0.9 %
10 SYRINGE (ML) INJECTION AS NEEDED
Status: DISCONTINUED | OUTPATIENT
Start: 2025-01-18 | End: 2025-01-20 | Stop reason: HOSPADM

## 2025-01-18 RX ORDER — ALBUTEROL SULFATE 0.83 MG/ML
2.5 SOLUTION RESPIRATORY (INHALATION) EVERY 6 HOURS PRN
Status: ON HOLD | COMMUNITY
End: 2025-01-18

## 2025-01-18 RX ORDER — SACCHAROMYCES BOULARDII 250 MG
250 CAPSULE ORAL 2 TIMES DAILY
Status: DISCONTINUED | OUTPATIENT
Start: 2025-01-18 | End: 2025-01-20 | Stop reason: HOSPADM

## 2025-01-18 RX ORDER — LORAZEPAM 0.5 MG/1
0.5 TABLET ORAL EVERY 6 HOURS PRN
Status: DISCONTINUED | OUTPATIENT
Start: 2025-01-18 | End: 2025-01-20 | Stop reason: HOSPADM

## 2025-01-18 RX ADMIN — HYDROMORPHONE HYDROCHLORIDE 0.25 MG: 1 INJECTION, SOLUTION INTRAMUSCULAR; INTRAVENOUS; SUBCUTANEOUS at 14:23

## 2025-01-18 RX ADMIN — HYDROMORPHONE HYDROCHLORIDE 0.25 MG: 1 INJECTION, SOLUTION INTRAMUSCULAR; INTRAVENOUS; SUBCUTANEOUS at 19:21

## 2025-01-18 RX ADMIN — LEVOTHYROXINE SODIUM 50 MCG: 0.05 TABLET ORAL at 09:50

## 2025-01-18 RX ADMIN — Medication 250 MG: at 09:25

## 2025-01-18 RX ADMIN — PIPERACILLIN AND TAZOBACTAM 4.5 G: 4; .5 INJECTION, POWDER, FOR SOLUTION INTRAVENOUS; PARENTERAL at 19:22

## 2025-01-18 RX ADMIN — HYDROMORPHONE HYDROCHLORIDE 0.25 MG: 1 INJECTION, SOLUTION INTRAMUSCULAR; INTRAVENOUS; SUBCUTANEOUS at 07:46

## 2025-01-18 RX ADMIN — Medication 10 ML: at 09:50

## 2025-01-18 RX ADMIN — HYDROMORPHONE HYDROCHLORIDE 0.5 MG: 1 INJECTION, SOLUTION INTRAMUSCULAR; INTRAVENOUS; SUBCUTANEOUS at 09:49

## 2025-01-18 RX ADMIN — ENOXAPARIN SODIUM 60 MG: 100 INJECTION SUBCUTANEOUS at 19:21

## 2025-01-18 RX ADMIN — HYDROMORPHONE HYDROCHLORIDE 1 MG: 1 INJECTION, SOLUTION INTRAMUSCULAR; INTRAVENOUS; SUBCUTANEOUS at 04:23

## 2025-01-18 RX ADMIN — PIPERACILLIN AND TAZOBACTAM 4.5 G: 4; .5 INJECTION, POWDER, FOR SOLUTION INTRAVENOUS; PARENTERAL at 12:03

## 2025-01-18 RX ADMIN — Medication 10 ML: at 19:23

## 2025-01-18 RX ADMIN — SODIUM CHLORIDE, POTASSIUM CHLORIDE, SODIUM LACTATE AND CALCIUM CHLORIDE 100 ML/HR: 600; 310; 30; 20 INJECTION, SOLUTION INTRAVENOUS at 07:32

## 2025-01-18 RX ADMIN — PIPERACILLIN AND TAZOBACTAM 3.38 G: 3; .375 INJECTION, POWDER, FOR SOLUTION INTRAVENOUS at 04:26

## 2025-01-18 RX ADMIN — ENOXAPARIN SODIUM 60 MG: 100 INJECTION SUBCUTANEOUS at 07:38

## 2025-01-18 RX ADMIN — Medication 250 MG: at 19:21

## 2025-01-18 NOTE — PLAN OF CARE
Goal Outcome Evaluation:              Outcome Evaluation: Pt is alert and oriented, IV antibiotics, Cpap, IV fluids, Dilaudid for pain control -- no michaelle or gluten, pt is on a full liquid diet but request clears.. Contine accuchecks.  Goal is for pain to be decreased.. No c/o of nausea and no BM this shift

## 2025-01-18 NOTE — PROGRESS NOTES
[facility]   Hospitalist Progress Note  Date: 2025  Patient Name: Mina Rivera  : 1970  MRN: [id number]  Date of admission: 2025      Subjective   Subjective       Chief complaint: Abdominal pain     Summary:  54-year-old female history of diabetes, hypertension, dyslipidemia, LASHA, asthma, paroxysmal atrial fibrillation not on anticoagulation, obesity, IBS, fibromyalgia, GERD without esophagitis, anxiety, depression, diabetic peripheral neuropathy, history of DVT, chronic lymphedema of lower extremities, RA, hospitalized on 2025 with chief complaint of abdominal pain, with elevated white blood cell count with urinalysis suggestive of UTI with CT abdomen showing severe acute diverticulitis in the proximal to mid sigmoid colon, placed on IV antibiotics    Interval follow-up: Reviewed hospitalization plan    Review of systems:  All systems reviewed and negative except for abdominal pain across her lower abdomen from left to right      Objective   Objective     Vitals:   Temp:  [97.7 °F (36.5 °C)-99.9 °F (37.7 °C)] 99.1 °F (37.3 °C)  Heart Rate:  [76-85] 76  Resp:  [18-28] 20  BP: (140-162)/(58-96) 140/58  Physical Exam               Constitutional: Awake, alert sitting at the edge of the bed              Eyes: PERRLA, sclerae anicteric, no conjunctival injection              HENT: NCAT, mucous membranes moist              Neck: Supple, no thyromegaly, no lymphadenopathy, trachea midline              Respiratory: Clear to auscultation bilaterally, nonlabored respirations               Cardiovascular: RRR, no murmurs, rubs, or gallops, palpable pedal pulses bilaterally              Gastrointestinal: Right lower quadrant abdominal pain, positive bowel sounds, soft, nondistended              Musculoskeletal: Lower extremity edema with chronic lymphedema, no clubbing or cyanosis to extremities              Psychiatric: Appropriate affect, cooperative              Neurologic: Oriented x 3,  strength symmetric in all extremities, Cranial Nerves grossly intact to confrontation, speech clear              Skin: No rashes     Result Review    Result Review:  I have personally reviewed the pertinent results from the past 24 hours to 1/18/2025 09:45 EST and agree with these findings:  [x]  Laboratory   CBC          7/7/2024    01:07 1/18/2025    01:22   CBC   WBC 8.35  11.49    RBC 4.59  4.61    Hemoglobin 12.4  12.5    Hematocrit 39.5  39.1    MCV 86.1  84.8    MCH 27.0  27.1    MCHC 31.4  32.0    RDW 15.3  15.7    Platelets 376  393      BMP          7/7/2024    01:07 1/18/2025    01:22   BMP   BUN 7  11    Creatinine 0.78  0.78    Sodium 140  140    Potassium 3.7  3.5    Chloride 105  104    CO2 25.0  24.3    Calcium 8.7  8.8      LIVER FUNCTION TESTS:      Lab 01/18/25  0122   TOTAL PROTEIN 7.3   ALBUMIN 3.6   GLOBULIN 3.7   ALT (SGPT) 18   AST (SGOT) 24   BILIRUBIN 0.5   ALK PHOS 115   LIPASE 31       [x]  Microbiology   Microbiology Results (last 10 days)       ** No results found for the last 240 hours. **              [x]  Radiology CT Abdomen Pelvis Without Contrast    Result Date: 1/18/2025  1.Severe acute diverticulitis of the proximal to mid sigmoid colon. No clear evidence of perforation. 2.Stable cystic lesion about the vaginal cuff, probably of ovarian origin. Once again, this can be assessed with nonemergent pelvic ultrasound. 3.Hepatomegaly. 4.No renal or ureteral stones. No hydronephrosis. 5.Hysterectomy and cholecystectomy. Electronically Signed: Ayo Parish MD  1/18/2025 3:25 AM EST  Workstation ID: [id number]       []  EKG/Telemetry   No orders to display       []  Cardiology/Vascular   []  Pathology  [x]  Old records  []  Other:    Assessment & Plan   Assessment / Plan     Assessment/Plan:  Assessment:  Acute diverticulitis; severe without perforation  Diabetes mellitus with neuropathy  Hypertension  Dyslipidemia  LASHA  Asthma  Paroxysmal atrial fibrillation  IBS  Fibromyalgia  History  of DVT not on anticoagulation  Anxiety  Depression  GERD with esophagitis  Chronic lymphedema of the lower extremities    Plan:  Labs and imaging reviewed  Change diet to full liquid liquids; notify dietary this patient cannot have any ginger in her foods including broth  Additional 0.5 mg dose of IV Dilaudid  Continue Zosyn IV  Hold Lasix  Continue LR IV fluids at 100 cc/h  Insulin sliding scale coverage  Continue levothyroxine  Florastor  Follow-up cultures  Continue Trintellix  Pain control with morphine and Dilaudid ordered  DuoNebs as needed for wheezing  Zofran for nausea  A.m. labs  Full code  DVT prophylaxis with Lovenox  Clinical course to dictate further management  Discussed with nurse at the bedside    VTE Prophylaxis:  Pharmacologic VTE prophylaxis orders are present.        CODE STATUS:   Level Of Support Discussed With: Patient  Code Status (Patient has no pulse and is not breathing): CPR (Attempt to Resuscitate)  Medical Interventions (Patient has pulse or is breathing): Full Support        Electronically signed by Valentino Arzola MD, 1/18/2025, 09:45 EST.    Portions of this documentation were transcribed electronically from a voice recognition software.  I confirm all data accurately represents the service(s) I performed at today's visit.

## 2025-01-18 NOTE — ED PROVIDER NOTES
Time: 4:14 AM EST  Date of encounter:  1/18/2025  Independent Historian/Clinical History and Information was obtained by:   Patient    History is limited by: N/A    Chief Complaint: Abdominal pain      History of Present Illness:  Patient is a 54 y.o. year old female who presents to the emergency department for evaluation of severe left-sided abdominal pain.  States that it woke her up from her sleep around 11 PM.  She has had severe pain since then.  She denies any nausea or vomiting.  She has a history of IBS.  History of cholecystectomy, hysterectomy.  Denies any urinary symptoms.  She had a regular bowel movement today.      Patient Care Team  Primary Care Provider: Janki Bolanos APRN    Past Medical History:     Allergies   Allergen Reactions    Bee Venom Anaphylaxis    Fd&C Yellow #5 (Tartrazine) Anaphylaxis     Patient states that she tolerates Keflex and Diflucan well by taking over-the-counter Benadryl along with the medications.    Neeta Anaphylaxis and Other (See Comments)    Iodinated Contrast Media Anaphylaxis, Itching and Other (See Comments)    Iodine Anaphylaxis    Meloxicam Shortness Of Breath and Swelling    Red Dye #40 (Allura Red) Shortness Of Breath    Methocarbamol Nausea And Vomiting    Oxycodone-Acetaminophen Itching, Swelling and Other (See Comments)    Famotidine Other (See Comments)     Pt experienced burning in vein and redness of arm above IV site    Food Color Red Other (See Comments)    Shellfish Allergy Other (See Comments)    Sumatriptan Swelling    Tramadol Other (See Comments)    Tramadol Hcl Other (See Comments)     ULCERS IN MOUTH    Adhesive Tape Rash     Past Medical History:   Diagnosis Date    A-fib     Asthma     Diabetes mellitus     Hypertension     Lymphedema     Migraine     Murmur     Sleep apnea      Past Surgical History:   Procedure Laterality Date    CHOLECYSTECTOMY      FOOT SURGERY      HYSTERECTOMY      INCONTINENCE SURGERY       History reviewed. No  pertinent family history.    Home Medications:  Prior to Admission medications    Medication Sig Start Date End Date Taking? Authorizing Provider   albuterol sulfate  (90 Base) MCG/ACT inhaler INHALE 2 PUFFS INTO THE LUNGS BY MOUTH EVERY 6 HOURS AS NEEDED FOR WHEEZING    Ernestine Jackson MD   Blood Glucose Monitoring Suppl (OneTouch Verio Reflect) w/Device kit Daily. 9/19/23   Ernestine Jackson MD   Brejone Ellipta 100-25 MCG/ACT aerosol powder  Inhale 1 puff Daily. 12/30/23   Ernestine Jacskon MD   cetirizine (zyrTEC) 10 MG tablet Take 1 tablet by mouth Daily. 8/15/23 8/14/24  Ernestine Jackson MD   diclofenac (VOLTAREN) 75 MG EC tablet TAKE 1 TABLET BY MOUTH TWICE DAILY 12/16/24   Ben Herzog MD   esomeprazole (nexIUM) 40 MG capsule Take 1 capsule by mouth Every Morning Before Breakfast.    Ernestine Jackson MD   HYDROcodone-acetaminophen (NORCO) 7.5-325 MG per tablet Take 1 tablet by mouth 4 (Four) Times a Day. 3/23/24   Ernestine Jackson MD   ipratropium-albuterol (DUO-NEB) 0.5-2.5 mg/3 ml nebulizer TAKE 1 AMPULE BY MOUTH VIA NEBULIZATION EVERY 6 HOURS AS NEEDED FOR WHEEZING 12/30/23   Ernestine Jackson MD   Lancets (OneTouch Delica Plus [id number]) misc USE THREE TIMES DAILY AS NEEDED 1/23/24   Ernestine Jackson MD   levothyroxine (SYNTHROID, LEVOTHROID) 50 MCG tablet Take 1 tablet by mouth Daily. 3/28/24   Ernestine Jackson MD   LORazepam (ATIVAN) 1 MG tablet TAKE 1/2 TO 1 TABLET BY MOUTH EVERY 6 HOURS AS NEEDED FOR ANXIETY. MAX DAILY AMOUNT: 4 MG. MAX DAILY AMOUNT: 4 MG 2/21/24   Ernestine Jackson MD   metFORMIN ER (GLUCOPHAGE-XR) 500 MG 24 hr tablet Take 1 tablet by mouth Daily With Breakfast.    Ernestine Jackson MD   ondansetron ODT (ZOFRAN-ODT) 4 MG disintegrating tablet Take 1 tablet by mouth Every 8 (Eight) Hours As Needed for Nausea or Vomiting. 7/7/24   Deann Yeh APRN   promethazine (PHENERGAN) 25 MG tablet Take 1 tablet by mouth Every 6  "(Six) Hours As Needed for Vomiting or Nausea. 7/7/24   Deann Yeh APRN   Trintellix 20 MG tablet Take 1 tablet by mouth Daily.    Provider, Ernestine, MD        Social History:   Social History     Tobacco Use    Smoking status: Never    Smokeless tobacco: Never   Vaping Use    Vaping status: Never Used   Substance Use Topics    Alcohol use: Never    Drug use: Never         Review of Systems:  Review of Systems   Constitutional:  Negative for chills and fever.   HENT:  Negative for ear pain.    Eyes:  Negative for pain.   Respiratory:  Negative for cough and shortness of breath.    Cardiovascular:  Negative for chest pain.   Gastrointestinal:  Positive for abdominal pain. Negative for diarrhea, nausea and vomiting.   Genitourinary:  Negative for dysuria.   Musculoskeletal:  Negative for arthralgias.   Skin:  Negative for rash.   Neurological:  Negative for headaches.        Physical Exam:  /65 (Patient Position: Lying)   Pulse 83   Temp 97.7 °F (36.5 °C) (Oral)   Resp 25   Ht 170.2 cm (67.01\")   Wt (!) 175 kg (385 lb 12.9 oz)   SpO2 95%   BMI 60.41 kg/m²     Physical Exam  Vitals and nursing note reviewed.   Constitutional:       Appearance: Normal appearance.   HENT:      Head: Normocephalic and atraumatic.      Nose: Nose normal.   Eyes:      Extraocular Movements: Extraocular movements intact.      Conjunctiva/sclera: Conjunctivae normal.      Pupils: Pupils are equal, round, and reactive to light.   Cardiovascular:      Rate and Rhythm: Normal rate and regular rhythm.      Heart sounds: Normal heart sounds.   Pulmonary:      Effort: Pulmonary effort is normal.      Breath sounds: Normal breath sounds.   Abdominal:      General: Abdomen is flat.      Palpations: Abdomen is soft.      Tenderness: There is abdominal tenderness in the left upper quadrant and left lower quadrant.   Musculoskeletal:         General: Normal range of motion.      Cervical back: Normal range of motion and neck supple. "   Skin:     General: Skin is warm and dry.   Neurological:      General: No focal deficit present.      Mental Status: She is alert and oriented to person, place, and time.   Psychiatric:         Mood and Affect: Mood normal.         Behavior: Behavior normal.                    Medical Decision Making:      Comorbidities that affect care:    Obesity, diabetes, A-fib, hypertension    External Notes reviewed:    Previous ED Note: Reviewed ED note on 12/3/2024.      The following orders were placed and all results were independently analyzed by me:  Orders Placed This Encounter   Procedures    Blood Culture - Blood,    Blood Culture - Blood,    CT Abdomen Pelvis Without Contrast    [place] Draw    Comprehensive Metabolic Panel    Lipase    Urinalysis With Microscopic If Indicated (No Culture) - Urine, Clean Catch    Lactic Acid, Plasma    CBC Auto Differential    Urinalysis, Microscopic Only - Urine, Clean Catch    NPO Diet NPO Type: Strict NPO    Undress & Gown    Inpatient Hospitalist Consult    Insert Peripheral IV    Inpatient Admission    CBC & Differential    Green Top (Gel)    Lavender Top    Gold Top - SST    Light Blue Top       Medications Given in the Emergency Department:  Medications   sodium chloride 0.9 % flush 10 mL (has no administration in time range)   piperacillin-tazobactam (ZOSYN) IVPB 3.375 g IVPB in 100 mL NS (VTB) (has no administration in time range)   saccharomyces boulardii (FLORASTOR) capsule 250 mg (has no administration in time range)   HYDROmorphone (DILAUDID) injection 1 mg (1 mg Intravenous Given 1/18/25 0423)   piperacillin-tazobactam (ZOSYN) IVPB 3.375 g IVPB in 100 mL NS (VTB) (3.375 g Intravenous New Bag 1/18/25 0426)        ED Course:         Labs:    Lab Results (last 24 hours)       Procedure Component Value Units Date/Time    CBC & Differential [[id number]]  (Abnormal) Collected: 01/18/25 0122    Specimen: Blood from Arm, Left Updated: 01/18/25 0152    Narrative:      The  following orders were created for panel order CBC & Differential.  Procedure                               Abnormality         Status                     ---------                               -----------         ------                     CBC Auto Differential[[id number]]        Abnormal            Final result                 Please view results for these tests on the individual orders.    Comprehensive Metabolic Panel [[id number]]  (Abnormal) Collected: 01/18/25 0122    Specimen: Blood from Arm, Left Updated: 01/18/25 0155     Glucose 120 mg/dL      BUN 11 mg/dL      Creatinine 0.78 mg/dL      Sodium 140 mmol/L      Potassium 3.5 mmol/L      Chloride 104 mmol/L      CO2 24.3 mmol/L      Calcium 8.8 mg/dL      Total Protein 7.3 g/dL      Albumin 3.6 g/dL      ALT (SGPT) 18 U/L      AST (SGOT) 24 U/L      Alkaline Phosphatase 115 U/L      Total Bilirubin 0.5 mg/dL      Globulin 3.7 gm/dL      A/G Ratio 1.0 g/dL      BUN/Creatinine Ratio 14.1     Anion Gap 11.7 mmol/L      eGFR 91.5 mL/min/1.73     Narrative:      GFR Categories in Chronic Kidney Disease (CKD)      GFR Category          GFR (mL/min/1.73)    Interpretation  G1                     90 or greater         Normal or high (1)  G2                      60-89                Mild decrease (1)  G3a                   45-59                Mild to moderate decrease  G3b                   30-44                Moderate to severe decrease  G4                    15-29                Severe decrease  G5                    14 or less           Kidney failure          (1)In the absence of evidence of kidney disease, neither GFR category G1 or G2 fulfill the criteria for CKD.    eGFR calculation 2021 CKD-EPI creatinine equation, which does not include race as a factor    Lipase [[id number]]  (Normal) Collected: 01/18/25 0122    Specimen: Blood from Arm, Left Updated: 01/18/25 0155     Lipase 31 U/L     Lactic Acid, Plasma [[id number]]  (Normal) Collected: 01/18/25 0122     Specimen: Blood from Arm, Left Updated: 01/18/25 0153     Lactate 1.8 mmol/L     CBC Auto Differential [[id number]]  (Abnormal) Collected: 01/18/25 0122    Specimen: Blood from Arm, Left Updated: 01/18/25 0152     WBC 11.49 10*3/mm3      RBC 4.61 10*6/mm3      Hemoglobin 12.5 g/dL      Hematocrit 39.1 %      MCV 84.8 fL      MCH 27.1 pg      MCHC 32.0 g/dL      RDW 15.7 %      RDW-SD 47.7 fl      MPV 10.2 fL      Platelets 393 10*3/mm3      Neutrophil % 81.9 %      Lymphocyte % 11.1 %      Monocyte % 4.6 %      Eosinophil % 1.6 %      Basophil % 0.3 %      Immature Grans % 0.5 %      Neutrophils, Absolute 9.40 10*3/mm3      Lymphocytes, Absolute 1.28 10*3/mm3      Monocytes, Absolute 0.53 10*3/mm3      Eosinophils, Absolute 0.18 10*3/mm3      Basophils, Absolute 0.04 10*3/mm3      Immature Grans, Absolute 0.06 10*3/mm3      nRBC 0.0 /100 WBC     Urinalysis With Microscopic If Indicated (No Culture) - Urine, Clean Catch [[id number]]  (Abnormal) Collected: 01/18/25 0208    Specimen: Urine, Clean Catch Updated: 01/18/25 0218     Color, UA Yellow     Appearance, UA Cloudy     pH, UA 5.5     Specific Gravity, UA 1.022     Glucose, UA Negative     Ketones, UA Negative     Bilirubin, UA Negative     Blood, UA Small (1+)     Protein, UA Trace     Leuk Esterase, UA Moderate (2+)     Nitrite, UA Negative     Urobilinogen, UA 1.0 E.U./dL    Urinalysis, Microscopic Only - Urine, Clean Catch [[id number]]  (Abnormal) Collected: 01/18/25 0208    Specimen: Urine, Clean Catch Updated: 01/18/25 0218     RBC, UA 6-10 /HPF      WBC, UA Too Numerous to Count /HPF      Bacteria, UA 2+ /HPF      Squamous Epithelial Cells, UA 7-12 /HPF      Hyaline Casts, UA 7-12 /LPF      Methodology Automated Microscopy             Imaging:    CT Abdomen Pelvis Without Contrast    Result Date: 1/18/2025  CT ABDOMEN PELVIS WO CONTRAST Date of Exam: 1/18/2025 3:13 AM EST Indication: Left lower quadrant abdominal pain. Comparison: 7/7/2024 Technique: Axial CT  images were obtained of the abdomen and pelvis without the administration of contrast. Reconstructed coronal and sagittal images were also obtained. Automated exposure control and iterative construction methods were used. Findings: There is mild scarring/atelectasis in the lung bases. Abdominal aorta is normal in caliber. Gallbladder is surgically absent. No biliary obstruction. There is fatty atrophy of the pancreas. The liver remains enlarged. Solid abdominal organs are otherwise  normal. No renal or ureteral stones. No hydronephrosis. Urinary bladder is normal. Uterus is surgically absent. Again seen is a cystic lesion about the vaginal cuff, probably of ovarian origin. It measures about 4.3 x 5.8 cm and is probably not significantly changed. Once again, this can be assessed with nonemergent pelvic ultrasound. There is severe acute diverticulitis involving the proximal to mid sigmoid colon with adjacent inflammatory stranding and fluid. No clear evidence of perforation. The appendix is not definitely identified, but there is no evidence of acute appendicitis. No small bowel obstruction is seen. Stomach is normal except for a small hiatal hernia. Few mildly prominent periportal lymph nodes are unchanged. No retroperitoneal adenopathy.     1.Severe acute diverticulitis of the proximal to mid sigmoid colon. No clear evidence of perforation. 2.Stable cystic lesion about the vaginal cuff, probably of ovarian origin. Once again, this can be assessed with nonemergent pelvic ultrasound. 3.Hepatomegaly. 4.No renal or ureteral stones. No hydronephrosis. 5.Hysterectomy and cholecystectomy. Electronically Signed: Ayo Parish MD  1/18/2025 3:25 AM EST  Workstation ID: [id number]       Differential Diagnosis and Discussion:    Abdominal Pain: Based on the patient's signs and symptoms, I considered abdominal aortic aneurysm, small bowel obstruction, pancreatitis, acute cholecystitis, acute appendecitis, peptic ulcer disease,  gastritis, colitis, endocrine disorders, irritable bowel syndrome and other differential diagnosis an etiology of the patient's abdominal pain.    PROCEDURES:    Labs were collected in the emergency department and all labs were reviewed and interpreted by me.  CT scan was performed in the emergency department and the CT scan radiology impression was interpreted by me.    No orders to display       Procedures    MDM     Amount and/or Complexity of Data Reviewed  Clinical lab tests: reviewed  Tests in the radiology section of CPT®: reviewed    Risk of Complications, Morbidity, and/or Mortality  Presenting problems: moderate  Diagnostic procedures: moderate  Management options: moderate    Patient Progress  Patient progress: stable    Patient presents to the emergency department for evaluation of severe left-sided abdominal pain.  States that it woke her up from her sleep around 11 PM.  She has had severe pain since then.  She denies any nausea or vomiting.  She has a history of IBS.  History of cholecystectomy, hysterectomy.  Denies any urinary symptoms.  She had a regular bowel movement today.    On exam, patient tender to palpation to the left upper and left lower quadrants.    CBC shows a slightly elevated white count of 11.49.  The patient´s CMP that was reviewed and interpretted by me shows no abnormalities of critical concern. Of note, the patient´s sodium and potassium are acceptable. The patient´s liver enzymes are unremarkable. The patient´s renal function (creatinine) is preserved. The patient has a normal anion gap.  Lactate 1.8.  Lipase 31.    UA is positive for moderate amount of leukocytes and 2+ bacteria.  There is a large amount of squamous cell.     CT Abdomen Pelvis Without Contrast   Final Result   1.Severe acute diverticulitis of the proximal to mid sigmoid colon. No clear evidence of perforation.   2.Stable cystic lesion about the vaginal cuff, probably of ovarian origin. Once again, this can be  assessed with nonemergent pelvic ultrasound.   3.Hepatomegaly.   4.No renal or ureteral stones. No hydronephrosis.   5.Hysterectomy and cholecystectomy.            Electronically Signed: Ayo Parish MD     1/18/2025 3:25 AM EST     Workstation ID: [id number]        Discussed the imaging findings with the patient. Started the patient on dilaudid and zosyn.                  Patient Care Considerations:    SEPSIS was considered but is NOT present in the emergency department as SIRS criteria is not present.      Consultants/Shared Management Plan:    Hospitalist: I have discussed the case with Dr. Carney who agrees to accept the patient for admission.    Social Determinants of Health:    Patient is independent, reliable, and has access to care.       Disposition and Care Coordination:    Admit:   Through independent evaluation of the patient's history, physical, and imperical data, the patient meets criteria for inpatient admission to the hospital.        Final diagnoses:   Diverticulitis        ED Disposition       ED Disposition   Decision to Admit    Condition   --    Comment   Level of Care: Remote Telemetry [26]   Diagnosis: Diverticulitis [[id number]]   Certification: I Certify That Inpatient Hospital Services Are Medically Necessary For Greater Than 2 Midnights                 This medical record created using voice recognition software.             Bacilio Downing PA  01/18/25 0450

## 2025-01-18 NOTE — PLAN OF CARE
Goal Outcome Evaluation:  Plan of Care Reviewed With: patient        Progress: no change  Outcome Evaluation: patient A&0 x3, pleasent and in good spirits. New admit from ER this morning.

## 2025-01-18 NOTE — H&P
[facility]   HISTORY AND PHYSICAL    Patient Name: Mina Rivera  : 1970  MRN: [id number]  Primary Care Physician:  Janki Bolanos APRN  Date of admission: 2025    Subjective   Subjective     Chief Complaint: Abdominal pain    HPI:    Mina Rivera is a 54 y.o. female with past medical history of diabetes, hypertension, hyperlipidemia, LASHA, asthma, A-fib not on AC, morbid obesity, IBS, fibromyalgia, and GERD presented to the ED for evaluation of abdominal pain.  Patient stated around 11 PM last night she had sudden onset left lower quadrant abdominal pain that woke her up from her sleep.  Patient describes the pain as sharp, radiating to her groin, 10 out of 10 at its worst and associated with nausea.  She denied any diarrhea or bloody bowel movements.  Due to the unbearable pain patient came to the ED for further evaluation.  Of note patient has surgical history of cholecystectomy and hysterectomy.  In the ED patient was slightly hypertensive on arrival with remaining vitals being within normal limits.  Labs showed mild leukocytosis and UA with UTI.  CT abdomen showed severe acute diverticulitis of the proximal to mid sigmoid colon with no evidence of perforation.  When seen patient was still complaining of left lower quadrant pain although improved with IV meds.  When asked she denied any recent fevers, headaches, focal weakness, chest pain, palpitation, shortness of breath, cough, diarrhea, constipation, dysuria, hematuria, hematochezia, melena, or anxiety.  Patient admitted for further evaluation and treatment    Review of Systems   All systems were reviewed and negative except for: As per HPI    Personal History     Past Medical History:   Diagnosis Date    A-fib     Asthma     Diabetes mellitus     Hypertension     Lymphedema     Migraine     Murmur     Sleep apnea        Past Surgical History:   Procedure Laterality Date    CHOLECYSTECTOMY      FOOT SURGERY      HYSTERECTOMY       INCONTINENCE SURGERY         Family History: family history is not on file. Otherwise pertinent FHx was reviewed and not pertinent to current issue.    Social History:  reports that she has never smoked. She has never used smokeless tobacco. She reports that she does not drink alcohol and does not use drugs.    Home Medications:  Fluticasone Furoate-Vilanterol, HYDROcodone-acetaminophen, LORazepam, OneTouch Delica Plus [id number], OneTouch Verio Reflect, Vortioxetine HBr, albuterol sulfate HFA, cetirizine, diclofenac, esomeprazole, furosemide, ipratropium-albuterol, levothyroxine, metFORMIN ER, ondansetron ODT, and promethazine      Allergies:  Allergies   Allergen Reactions    Bee Venom Anaphylaxis    Fd&C Yellow #5 (Tartrazine) Anaphylaxis     Patient states that she tolerates Keflex and Diflucan well by taking over-the-counter Benadryl along with the medications.    Neeta Anaphylaxis and Other (See Comments)    Iodinated Contrast Media Anaphylaxis, Itching and Other (See Comments)    Iodine Anaphylaxis    Meloxicam Shortness Of Breath and Swelling    Red Dye #40 (Allura Red) Shortness Of Breath    Methocarbamol Nausea And Vomiting    Oxycodone-Acetaminophen Itching, Swelling and Other (See Comments)    Famotidine Other (See Comments)     Pt experienced burning in vein and redness of arm above IV site    Food Color Red Other (See Comments)    Shellfish Allergy Other (See Comments)    Sumatriptan Swelling    Tramadol Other (See Comments)    Tramadol Hcl Other (See Comments)     ULCERS IN MOUTH    Adhesive Tape Rash       Objective   Objective     Vitals:   Temp:  [97.7 °F (36.5 °C)] 97.7 °F (36.5 °C)  Heart Rate:  [78-85] 85  Resp:  [18-28] 28  BP: (145-162)/(61-96) 145/61  Physical Exam    Constitutional: Awake, alert   Eyes: PERRLA, sclerae anicteric, no conjunctival injection   HENT: NCAT, mucous membranes moist   Neck: Supple, no thyromegaly, no lymphadenopathy, trachea midline   Respiratory: Clear to  auscultation bilaterally, nonlabored respirations    Cardiovascular: RRR, no murmurs, rubs, or gallops, palpable pedal pulses bilaterally   Gastrointestinal: Right lower quadrant abdominal pain, positive bowel sounds, soft, nondistended   Musculoskeletal: Lower extremity edema, no clubbing or cyanosis to extremities   Psychiatric: Appropriate affect, cooperative   Neurologic: Oriented x 3, strength symmetric in all extremities, Cranial Nerves grossly intact to confrontation, speech clear   Skin: No rashes     Result Review    Result Review:  I have personally reviewed the results from the time of this admission to 1/18/2025 06:08 EST and agree with these findings:  [x]  Laboratory list / accordion  []  Microbiology  [x]  Radiology  [x]  EKG/Telemetry   []  Cardiology/Vascular   []  Pathology  []  Old records  []  Other:  Most notable findings include:  leukocytosis and UA with UTI.  CT abdomen showed severe acute diverticulitis of the proximal to mid sigmoid colon with no evidence of perforation      Assessment & Plan   Assessment / Plan     Brief Patient Summary:  Mina Rivera is a 54 y.o. female with past medical history of diabetes, hypertension, hyperlipidemia, LASHA, asthma, A-fib not on AC, morbid obesity, IBS, fibromyalgia, and GERD presented to the ED for evaluation of abdominal pain    Active Hospital Problems:  Active Hospital Problems    Diagnosis     **Diverticulitis     LASHA (obstructive sleep apnea)     Type 2 diabetes mellitus     Essential hypertension     IBS (irritable bowel syndrome)      Plan:     Diverticulitis  -Admit to telemetry  -CT abdomen reviewed  -Denies prior episode  -Pain meds as needed  -Empiric antibiotics  -Blood cultures  -Diet as tolerated  -Consult general surgery if warranted  -Will follow along    HTN  -Currently well controlled  -PRN BP meds  -Resume home meds when available  -Titrate if needed    Diabetes  -Insulin sliding scale  -Titrate as needed    LASHA  -CPAP    A-fib not  on AC  Morbid obesity  IBS  Fibromyalgia    GI ppx  DVT ppx    VTE Prophylaxis:  Pharmacologic VTE prophylaxis orders are signed & held.          CODE STATUS: Full code     Admission Status:  I believe this patient meets inpatient status.      Electronically signed by Edilberto Carney MD, 01/18/25, 6:08 AM EST.

## 2025-01-19 LAB
ALBUMIN SERPL-MCNC: 3 G/DL (ref 3.5–5.2)
ALP SERPL-CCNC: 94 U/L (ref 39–117)
ALT SERPL W P-5'-P-CCNC: 11 U/L (ref 1–33)
ANION GAP SERPL CALCULATED.3IONS-SCNC: 10.3 MMOL/L (ref 5–15)
AST SERPL-CCNC: 16 U/L (ref 1–32)
BASOPHILS # BLD AUTO: 0.04 10*3/MM3 (ref 0–0.2)
BASOPHILS NFR BLD AUTO: 0.4 % (ref 0–1.5)
BILIRUB CONJ SERPL-MCNC: 0.2 MG/DL (ref 0–0.3)
BILIRUB INDIRECT SERPL-MCNC: 0.9 MG/DL
BILIRUB SERPL-MCNC: 1.1 MG/DL (ref 0–1.2)
BUN SERPL-MCNC: 10 MG/DL (ref 6–20)
BUN/CREAT SERPL: 11.9 (ref 7–25)
CALCIUM SPEC-SCNC: 8 MG/DL (ref 8.6–10.5)
CHLORIDE SERPL-SCNC: 101 MMOL/L (ref 98–107)
CO2 SERPL-SCNC: 23.7 MMOL/L (ref 22–29)
CREAT SERPL-MCNC: 0.84 MG/DL (ref 0.57–1)
DEPRECATED RDW RBC AUTO: 47.6 FL (ref 37–54)
EGFRCR SERPLBLD CKD-EPI 2021: 83.7 ML/MIN/1.73
EOSINOPHIL # BLD AUTO: 0.09 10*3/MM3 (ref 0–0.4)
EOSINOPHIL NFR BLD AUTO: 0.9 % (ref 0.3–6.2)
ERYTHROCYTE [DISTWIDTH] IN BLOOD BY AUTOMATED COUNT: 15.7 % (ref 12.3–15.4)
GLUCOSE BLDC GLUCOMTR-MCNC: 102 MG/DL (ref 70–99)
GLUCOSE BLDC GLUCOMTR-MCNC: 116 MG/DL (ref 70–99)
GLUCOSE BLDC GLUCOMTR-MCNC: 126 MG/DL (ref 70–99)
GLUCOSE BLDC GLUCOMTR-MCNC: 128 MG/DL (ref 70–99)
GLUCOSE SERPL-MCNC: 110 MG/DL (ref 65–99)
HCT VFR BLD AUTO: 34.8 % (ref 34–46.6)
HGB BLD-MCNC: 11 G/DL (ref 12–15.9)
IMM GRANULOCYTES # BLD AUTO: 0.05 10*3/MM3 (ref 0–0.05)
IMM GRANULOCYTES NFR BLD AUTO: 0.5 % (ref 0–0.5)
LYMPHOCYTES # BLD AUTO: 1.55 10*3/MM3 (ref 0.7–3.1)
LYMPHOCYTES NFR BLD AUTO: 16.1 % (ref 19.6–45.3)
MAGNESIUM SERPL-MCNC: 1.9 MG/DL (ref 1.6–2.6)
MCH RBC QN AUTO: 26.8 PG (ref 26.6–33)
MCHC RBC AUTO-ENTMCNC: 31.6 G/DL (ref 31.5–35.7)
MCV RBC AUTO: 84.9 FL (ref 79–97)
MONOCYTES # BLD AUTO: 0.72 10*3/MM3 (ref 0.1–0.9)
MONOCYTES NFR BLD AUTO: 7.5 % (ref 5–12)
NEUTROPHILS NFR BLD AUTO: 7.15 10*3/MM3 (ref 1.7–7)
NEUTROPHILS NFR BLD AUTO: 74.6 % (ref 42.7–76)
NRBC BLD AUTO-RTO: 0 /100 WBC (ref 0–0.2)
PHOSPHATE SERPL-MCNC: 2.9 MG/DL (ref 2.5–4.5)
PLATELET # BLD AUTO: 335 10*3/MM3 (ref 140–450)
PMV BLD AUTO: 10.5 FL (ref 6–12)
POTASSIUM SERPL-SCNC: 3.7 MMOL/L (ref 3.5–5.2)
PROT SERPL-MCNC: 6.4 G/DL (ref 6–8.5)
RBC # BLD AUTO: 4.1 10*6/MM3 (ref 3.77–5.28)
SODIUM SERPL-SCNC: 135 MMOL/L (ref 136–145)
WBC NRBC COR # BLD AUTO: 9.6 10*3/MM3 (ref 3.4–10.8)

## 2025-01-19 PROCEDURE — 84100 ASSAY OF PHOSPHORUS: CPT | Performed by: FAMILY MEDICINE

## 2025-01-19 PROCEDURE — 94660 CPAP INITIATION&MGMT: CPT

## 2025-01-19 PROCEDURE — 80048 BASIC METABOLIC PNL TOTAL CA: CPT | Performed by: FAMILY MEDICINE

## 2025-01-19 PROCEDURE — 94761 N-INVAS EAR/PLS OXIMETRY MLT: CPT

## 2025-01-19 PROCEDURE — 25810000003 LACTATED RINGERS PER 1000 ML: Performed by: FAMILY MEDICINE

## 2025-01-19 PROCEDURE — 82948 REAGENT STRIP/BLOOD GLUCOSE: CPT

## 2025-01-19 PROCEDURE — 25010000002 HYDROMORPHONE 1 MG/ML SOLUTION: Performed by: FAMILY MEDICINE

## 2025-01-19 PROCEDURE — 94799 UNLISTED PULMONARY SVC/PX: CPT

## 2025-01-19 PROCEDURE — 85025 COMPLETE CBC W/AUTO DIFF WBC: CPT | Performed by: FAMILY MEDICINE

## 2025-01-19 PROCEDURE — 25010000002 PIPERACILLIN SOD-TAZOBACTAM PER 1 G: Performed by: FAMILY MEDICINE

## 2025-01-19 PROCEDURE — 25010000002 ENOXAPARIN PER 10 MG: Performed by: FAMILY MEDICINE

## 2025-01-19 PROCEDURE — 83735 ASSAY OF MAGNESIUM: CPT | Performed by: FAMILY MEDICINE

## 2025-01-19 PROCEDURE — 80076 HEPATIC FUNCTION PANEL: CPT | Performed by: FAMILY MEDICINE

## 2025-01-19 PROCEDURE — 99232 SBSQ HOSP IP/OBS MODERATE 35: CPT | Performed by: FAMILY MEDICINE

## 2025-01-19 RX ORDER — MAG HYDROX/ALUMINUM HYD/SIMETH 400-400-40
1 SUSPENSION, ORAL (FINAL DOSE FORM) ORAL ONCE
Status: COMPLETED | OUTPATIENT
Start: 2025-01-19 | End: 2025-01-19

## 2025-01-19 RX ADMIN — SODIUM CHLORIDE, POTASSIUM CHLORIDE, SODIUM LACTATE AND CALCIUM CHLORIDE 100 ML/HR: 600; 310; 30; 20 INJECTION, SOLUTION INTRAVENOUS at 04:15

## 2025-01-19 RX ADMIN — HYDROMORPHONE HYDROCHLORIDE 0.25 MG: 1 INJECTION, SOLUTION INTRAMUSCULAR; INTRAVENOUS; SUBCUTANEOUS at 01:49

## 2025-01-19 RX ADMIN — Medication 10 ML: at 20:05

## 2025-01-19 RX ADMIN — GLYCERIN 1 SUPPOSITORY: 2 SUPPOSITORY RECTAL at 10:32

## 2025-01-19 RX ADMIN — PIPERACILLIN AND TAZOBACTAM 4.5 G: 4; .5 INJECTION, POWDER, FOR SOLUTION INTRAVENOUS; PARENTERAL at 12:08

## 2025-01-19 RX ADMIN — Medication 250 MG: at 08:08

## 2025-01-19 RX ADMIN — Medication 250 MG: at 20:05

## 2025-01-19 RX ADMIN — ENOXAPARIN SODIUM 60 MG: 100 INJECTION SUBCUTANEOUS at 06:12

## 2025-01-19 RX ADMIN — ENOXAPARIN SODIUM 60 MG: 100 INJECTION SUBCUTANEOUS at 18:10

## 2025-01-19 RX ADMIN — HYDROMORPHONE HYDROCHLORIDE 0.25 MG: 1 INJECTION, SOLUTION INTRAMUSCULAR; INTRAVENOUS; SUBCUTANEOUS at 07:55

## 2025-01-19 RX ADMIN — HYDROMORPHONE HYDROCHLORIDE 0.25 MG: 1 INJECTION, SOLUTION INTRAMUSCULAR; INTRAVENOUS; SUBCUTANEOUS at 21:38

## 2025-01-19 RX ADMIN — PIPERACILLIN AND TAZOBACTAM 4.5 G: 4; .5 INJECTION, POWDER, FOR SOLUTION INTRAVENOUS; PARENTERAL at 04:15

## 2025-01-19 RX ADMIN — PIPERACILLIN AND TAZOBACTAM 4.5 G: 4; .5 INJECTION, POWDER, FOR SOLUTION INTRAVENOUS; PARENTERAL at 20:09

## 2025-01-19 RX ADMIN — LEVOTHYROXINE SODIUM 50 MCG: 0.05 TABLET ORAL at 06:12

## 2025-01-19 RX ADMIN — Medication 10 ML: at 08:09

## 2025-01-19 NOTE — PLAN OF CARE
Goal Outcome Evaluation:   Patient is very compliant with cpap use during sleep/naps.  On room air when off of unit.

## 2025-01-19 NOTE — PLAN OF CARE
Goal Outcome Evaluation:  Plan of Care Reviewed With: patient        Progress: improving  Outcome Evaluation: Pt remains alert and oriented on IV ABX, no SSI required this shift.  Pain has been more controlled today with less need for IV dilaudid.  Uses CPAP and up to bathroom with standby assistance

## 2025-01-19 NOTE — PLAN OF CARE
Goal Outcome Evaluation:              Outcome Evaluation: Patient remains A&Ox4; IV ABX, IV PRN pain medication. Patient requested MD clair notified and OK'd, nutrition communication order placed. NO c/o nausea, some heartburn noted. Ambulates to toilet either independent or STBY. CPaP used per patient administration.

## 2025-01-19 NOTE — PROGRESS NOTES
[facility]   Hospitalist Progress Note  Date: 2025  Patient Name: Mina Rivera  : 1970  MRN: [id number]  Date of admission: 2025      Subjective   Subjective     Chief complaint: Abdominal pain     Summary:  54-year-old female history of diabetes, hypertension, dyslipidemia, LASHA, asthma, paroxysmal atrial fibrillation not on anticoagulation, obesity, IBS, fibromyalgia, GERD without esophagitis, anxiety, depression, diabetic peripheral neuropathy, history of DVT, chronic lymphedema of lower extremities, RA, hospitalized on 2025 with chief complaint of abdominal pain, with elevated white blood cell count with urinalysis suggestive of UTI with CT abdomen showing severe acute diverticulitis in the proximal to mid sigmoid colon, placed on IV antibiotics    Interval follow-up: Seen and examined, no distress, no major events overnight, still complains of lower abdominal pain in the left side more so in the lower middle area.  She is tolerating liquid diet.  No BMs.  White blood cell count down to 9000, hemoglobin 9.0.  Clinically improving.  Creatinine 0.84, potassium 3.7, sodium 135.    Review of systems:  All systems reviewed and negative except for abdominal pain across her lower abdomen from left to right    Objective   Objective     Vitals:   Temp:  [97.9 °F (36.6 °C)-99.1 °F (37.3 °C)] 99 °F (37.2 °C)  Heart Rate:  [73-82] 75  Resp:  [20-22] 20  BP: (139-147)/(52-83) 144/63  Physical Exam               Constitutional: Awake, alert sitting at the edge of the bed              Eyes: PERRLA, sclerae anicteric, no conjunctival injection              HENT: NCAT, mucous membranes moist              Neck: Supple, full range of motion              Respiratory: Clear to auscultation bilaterally, nonlabored respirations               Cardiovascular: RRR, no murmurs, rubs, or gallops, palpable pedal pulses bilaterally              Gastrointestinal: Right lower quadrant abdominal pain, positive bowel  sounds, soft, nondistended              Musculoskeletal: Lower extremity edema with chronic lymphedema, no clubbing or cyanosis to extremities              Psychiatric: Appropriate affect, cooperative              Neurologic: Oriented x 3, strength symmetric in all extremities, Cranial Nerves grossly intact to confrontation, speech clear              Skin: No rashes     Result Review    Result Review:  I have personally reviewed the pertinent results from the past 24 hours to 1/19/2025 10:53 EST and agree with these findings:  [x]  Laboratory   CBC          7/7/2024    01:07 1/18/2025    01:22 1/19/2025    04:10   CBC   WBC 8.35  11.49  9.60    RBC 4.59  4.61  4.10    Hemoglobin 12.4  12.5  11.0    Hematocrit 39.5  39.1  34.8    MCV 86.1  84.8  84.9    MCH 27.0  27.1  26.8    MCHC 31.4  32.0  31.6    RDW 15.3  15.7  15.7    Platelets 376  393  335      BMP          7/7/2024    01:07 1/18/2025    01:22 1/19/2025    04:10   BMP   BUN 7  11  10    Creatinine 0.78  0.78  0.84    Sodium 140  140  135    Potassium 3.7  3.5  3.7    Chloride 105  104  101    CO2 25.0  24.3  23.7    Calcium 8.7  8.8  8.0      LIVER FUNCTION TESTS:      Lab 01/19/25  0410 01/18/25  0122   TOTAL PROTEIN 6.4 7.3   ALBUMIN 3.0* 3.6   GLOBULIN  --  3.7   ALT (SGPT) 11 18   AST (SGOT) 16 24   BILIRUBIN 1.1 0.5   INDIRECT BILIRUBIN 0.9  --    BILIRUBIN DIRECT 0.2  --    ALK PHOS 94 115   LIPASE  --  31       [x]  Microbiology   Microbiology Results (last 10 days)       Procedure Component Value - Date/Time    Blood Culture - Blood, Arm, Left [[id number]]  (Normal) Collected: 01/18/25 0523    Lab Status: Preliminary result Specimen: Blood from Arm, Left Updated: 01/19/25 0530     Blood Culture No growth at 24 hours    Narrative:      Less than seven (7) mL's of blood was collected.  Insufficient quantity may yield false negative results.    Blood Culture - Blood, Arm, Right [[id number]]  (Normal) Collected: 01/18/25 0523    Lab Status: Preliminary  result Specimen: Blood from Arm, Right Updated: 01/19/25 0530     Blood Culture No growth at 24 hours    Narrative:      Less than seven (7) mL's of blood was collected.  Insufficient quantity may yield false negative results.              [x]  Radiology CT Abdomen Pelvis Without Contrast    Result Date: 1/18/2025  1.Severe acute diverticulitis of the proximal to mid sigmoid colon. No clear evidence of perforation. 2.Stable cystic lesion about the vaginal cuff, probably of ovarian origin. Once again, this can be assessed with nonemergent pelvic ultrasound. 3.Hepatomegaly. 4.No renal or ureteral stones. No hydronephrosis. 5.Hysterectomy and cholecystectomy. Electronically Signed: Ayo Parish MD  1/18/2025 3:25 AM EST  Workstation ID: [id number]       []  EKG/Telemetry   No orders to display       []  Cardiology/Vascular   []  Pathology  [x]  Old records  []  Other:    Assessment & Plan   Assessment / Plan     Assessment/Plan:  Assessment:  Acute diverticulitis; severe without perforation  Diabetes mellitus with neuropathy  Hypertension  Dyslipidemia  LASHA  Asthma  Paroxysmal atrial fibrillation  IBS  Fibromyalgia  History of DVT not on anticoagulation  Anxiety  Depression  GERD with esophagitis  Chronic lymphedema of the lower extremities    Plan:  Labs and imaging reviewed  One-time dose of glycerin suppository  Continue liquid diet  Continue Zosyn IV  Hold Lasix; will reevaluate and resume tomorrow if warranted  Stop IV fluids  Insulin sliding scale coverage  Continue levothyroxine  Florastor  Follow-up cultures  Pain control with morphine and Dilaudid ordered  DuoNebs as needed for wheezing  Zofran for nausea  A.m. labs  Full code  DVT prophylaxis with Lovenox  Clinical course to dictate further management  Discussed with nurse at the bedside  VTE Prophylaxis:  Pharmacologic VTE prophylaxis orders are present.        CODE STATUS:   Level Of Support Discussed With: Patient  Code Status (Patient has no pulse and is  not breathing): CPR (Attempt to Resuscitate)  Medical Interventions (Patient has pulse or is breathing): Full Support        Electronically signed by Valentino Arzola MD, 1/19/2025, 10:53 EST.    Portions of this documentation were transcribed electronically from a voice recognition software.  I confirm all data accurately represents the service(s) I performed at today's visit.

## 2025-01-20 ENCOUNTER — READMISSION MANAGEMENT (OUTPATIENT)
Dept: CALL CENTER | Facility: HOSPITAL | Age: 55
End: 2025-01-20
Payer: MEDICARE

## 2025-01-20 VITALS
OXYGEN SATURATION: 96 % | BODY MASS INDEX: 44.41 KG/M2 | DIASTOLIC BLOOD PRESSURE: 86 MMHG | HEIGHT: 68 IN | HEART RATE: 77 BPM | RESPIRATION RATE: 17 BRPM | SYSTOLIC BLOOD PRESSURE: 153 MMHG | WEIGHT: 293 LBS | TEMPERATURE: 98.1 F

## 2025-01-20 LAB
ALBUMIN SERPL-MCNC: 3.3 G/DL (ref 3.5–5.2)
ALP SERPL-CCNC: 99 U/L (ref 39–117)
ALT SERPL W P-5'-P-CCNC: 11 U/L (ref 1–33)
ANION GAP SERPL CALCULATED.3IONS-SCNC: 12 MMOL/L (ref 5–15)
AST SERPL-CCNC: 13 U/L (ref 1–32)
BASOPHILS # BLD AUTO: 0.04 10*3/MM3 (ref 0–0.2)
BASOPHILS NFR BLD AUTO: 0.5 % (ref 0–1.5)
BILIRUB CONJ SERPL-MCNC: 0.4 MG/DL (ref 0–0.3)
BILIRUB INDIRECT SERPL-MCNC: 0.7 MG/DL
BILIRUB SERPL-MCNC: 1.1 MG/DL (ref 0–1.2)
BUN SERPL-MCNC: 6 MG/DL (ref 6–20)
BUN/CREAT SERPL: 8.1 (ref 7–25)
CALCIUM SPEC-SCNC: 8.2 MG/DL (ref 8.6–10.5)
CHLORIDE SERPL-SCNC: 102 MMOL/L (ref 98–107)
CO2 SERPL-SCNC: 25 MMOL/L (ref 22–29)
CREAT SERPL-MCNC: 0.74 MG/DL (ref 0.57–1)
DEPRECATED RDW RBC AUTO: 47.8 FL (ref 37–54)
EGFRCR SERPLBLD CKD-EPI 2021: 97.5 ML/MIN/1.73
EOSINOPHIL # BLD AUTO: 0.12 10*3/MM3 (ref 0–0.4)
EOSINOPHIL NFR BLD AUTO: 1.4 % (ref 0.3–6.2)
ERYTHROCYTE [DISTWIDTH] IN BLOOD BY AUTOMATED COUNT: 15.3 % (ref 12.3–15.4)
GLUCOSE BLDC GLUCOMTR-MCNC: 118 MG/DL (ref 70–99)
GLUCOSE SERPL-MCNC: 107 MG/DL (ref 65–99)
HCT VFR BLD AUTO: 37.1 % (ref 34–46.6)
HGB BLD-MCNC: 11.4 G/DL (ref 12–15.9)
IMM GRANULOCYTES # BLD AUTO: 0.06 10*3/MM3 (ref 0–0.05)
IMM GRANULOCYTES NFR BLD AUTO: 0.7 % (ref 0–0.5)
LYMPHOCYTES # BLD AUTO: 1.71 10*3/MM3 (ref 0.7–3.1)
LYMPHOCYTES NFR BLD AUTO: 19.5 % (ref 19.6–45.3)
MAGNESIUM SERPL-MCNC: 1.9 MG/DL (ref 1.6–2.6)
MCH RBC QN AUTO: 26.3 PG (ref 26.6–33)
MCHC RBC AUTO-ENTMCNC: 30.7 G/DL (ref 31.5–35.7)
MCV RBC AUTO: 85.7 FL (ref 79–97)
MONOCYTES # BLD AUTO: 0.51 10*3/MM3 (ref 0.1–0.9)
MONOCYTES NFR BLD AUTO: 5.8 % (ref 5–12)
NEUTROPHILS NFR BLD AUTO: 6.31 10*3/MM3 (ref 1.7–7)
NEUTROPHILS NFR BLD AUTO: 72.1 % (ref 42.7–76)
NRBC BLD AUTO-RTO: 0 /100 WBC (ref 0–0.2)
PHOSPHATE SERPL-MCNC: 2.6 MG/DL (ref 2.5–4.5)
PLATELET # BLD AUTO: 322 10*3/MM3 (ref 140–450)
PMV BLD AUTO: 10.1 FL (ref 6–12)
POTASSIUM SERPL-SCNC: 3.1 MMOL/L (ref 3.5–5.2)
PROT SERPL-MCNC: 6.5 G/DL (ref 6–8.5)
RBC # BLD AUTO: 4.33 10*6/MM3 (ref 3.77–5.28)
SODIUM SERPL-SCNC: 139 MMOL/L (ref 136–145)
WBC NRBC COR # BLD AUTO: 8.75 10*3/MM3 (ref 3.4–10.8)

## 2025-01-20 PROCEDURE — 99239 HOSP IP/OBS DSCHRG MGMT >30: CPT | Performed by: FAMILY MEDICINE

## 2025-01-20 PROCEDURE — 94660 CPAP INITIATION&MGMT: CPT

## 2025-01-20 PROCEDURE — 83735 ASSAY OF MAGNESIUM: CPT | Performed by: FAMILY MEDICINE

## 2025-01-20 PROCEDURE — 94799 UNLISTED PULMONARY SVC/PX: CPT

## 2025-01-20 PROCEDURE — 25010000002 PIPERACILLIN SOD-TAZOBACTAM PER 1 G: Performed by: FAMILY MEDICINE

## 2025-01-20 PROCEDURE — 82948 REAGENT STRIP/BLOOD GLUCOSE: CPT

## 2025-01-20 PROCEDURE — 25010000002 POTASSIUM CHLORIDE 10 MEQ/100ML SOLUTION: Performed by: FAMILY MEDICINE

## 2025-01-20 PROCEDURE — 85025 COMPLETE CBC W/AUTO DIFF WBC: CPT | Performed by: FAMILY MEDICINE

## 2025-01-20 PROCEDURE — 94761 N-INVAS EAR/PLS OXIMETRY MLT: CPT

## 2025-01-20 PROCEDURE — 84100 ASSAY OF PHOSPHORUS: CPT | Performed by: FAMILY MEDICINE

## 2025-01-20 PROCEDURE — 25010000002 ENOXAPARIN PER 10 MG: Performed by: FAMILY MEDICINE

## 2025-01-20 PROCEDURE — 80048 BASIC METABOLIC PNL TOTAL CA: CPT | Performed by: FAMILY MEDICINE

## 2025-01-20 PROCEDURE — 82948 REAGENT STRIP/BLOOD GLUCOSE: CPT | Performed by: FAMILY MEDICINE

## 2025-01-20 PROCEDURE — 80076 HEPATIC FUNCTION PANEL: CPT | Performed by: FAMILY MEDICINE

## 2025-01-20 RX ORDER — SACCHAROMYCES BOULARDII 250 MG
250 CAPSULE ORAL 2 TIMES DAILY
Qty: 60 CAPSULE | Refills: 0 | Status: SHIPPED | OUTPATIENT
Start: 2025-01-20 | End: 2025-02-19

## 2025-01-20 RX ORDER — POTASSIUM CHLORIDE 7.45 MG/ML
10 INJECTION INTRAVENOUS
Status: COMPLETED | OUTPATIENT
Start: 2025-01-20 | End: 2025-01-20

## 2025-01-20 RX ADMIN — POTASSIUM CHLORIDE 10 MEQ: 7.46 INJECTION, SOLUTION INTRAVENOUS at 10:03

## 2025-01-20 RX ADMIN — ENOXAPARIN SODIUM 60 MG: 100 INJECTION SUBCUTANEOUS at 06:47

## 2025-01-20 RX ADMIN — Medication 250 MG: at 08:42

## 2025-01-20 RX ADMIN — POTASSIUM CHLORIDE 10 MEQ: 7.46 INJECTION, SOLUTION INTRAVENOUS at 12:00

## 2025-01-20 RX ADMIN — AMOXICILLIN AND CLAVULANATE POTASSIUM 1 TABLET: 875; 125 TABLET, FILM COATED ORAL at 08:42

## 2025-01-20 RX ADMIN — Medication 10 ML: at 08:43

## 2025-01-20 RX ADMIN — LEVOTHYROXINE SODIUM 50 MCG: 0.05 TABLET ORAL at 06:47

## 2025-01-20 RX ADMIN — PIPERACILLIN AND TAZOBACTAM 4.5 G: 4; .5 INJECTION, POWDER, FOR SOLUTION INTRAVENOUS; PARENTERAL at 04:20

## 2025-01-20 RX ADMIN — POTASSIUM CHLORIDE 10 MEQ: 7.46 INJECTION, SOLUTION INTRAVENOUS at 10:58

## 2025-01-20 RX ADMIN — POTASSIUM CHLORIDE 10 MEQ: 7.46 INJECTION, SOLUTION INTRAVENOUS at 08:42

## 2025-01-20 NOTE — DISCHARGE INSTR - APPOINTMENTS
Please call Tuesday(1/21/25) and schedule a one week hospital follow up appointment with your PCP Janki APPIAH([phone]).

## 2025-01-20 NOTE — DISCHARGE SUMMARY
[facility]         HOSPITALIST  DISCHARGE SUMMARY    Patient Name: Mina Rivera  : 1970  MRN: [id number]    Date of Admission: 2025  Date of Discharge:  2025    Primary Care Physician: Janki Bolanos APRN    Consults       Date and Time Order Name Status Description    2025  4:37 AM Inpatient Hospitalist Consult              Active and Resolved Hospital Problems:  Active Hospital Problems    Diagnosis POA   • **Diverticulitis [K57.92] Yes   • LASHA (obstructive sleep apnea) [G47.33] Unknown   • Type 2 diabetes mellitus [E11.9] Unknown   • Essential hypertension [I10] Unknown   • IBS (irritable bowel syndrome) [K58.9] Unknown      Resolved Hospital Problems   No resolved problems to display.       Hospital Course     Hospital Course:  54-year-old female history of diabetes, hypertension, dyslipidemia, LASHA, asthma, paroxysmal atrial fibrillation not on anticoagulation, obesity, IBS, fibromyalgia, GERD without esophagitis, anxiety, depression, diabetic peripheral neuropathy, history of DVT, chronic lymphedema of lower extremities, RA, hospitalized on 2025 with chief complaint of abdominal pain, with elevated white blood cell count with urinalysis suggestive of UTI with CT abdomen showing severe acute diverticulitis in the proximal to mid sigmoid colon, placed on IV antibiotics, positive response to antibiotic regimen, diet reintroduced and slowly advanced.  As of 2025 she was hemodynamically stable at time of discharge, to follow-up with GI as outpatient.  New to the area, needs to establish care for IBS history.      Day of Discharge     Vital Signs:  Temp:  [97.9 °F (36.6 °C)-98.6 °F (37 °C)] 98.1 °F (36.7 °C)  Heart Rate:  [71-91] 80  Resp:  [18-24] 18  BP: (113-163)/(51-78) 151/78  Review of systems:  All systems reviewed and negative except for abdominal pain intermittently    Physical Exam                         Constitutional: Awake, alert sitting at the  edge of the bed              Eyes: PERRLA, sclerae anicteric, no conjunctival injection              HENT: NCAT, mucous membranes moist              Neck: Supple, full range of motion              Respiratory: Clear to auscultation bilaterally, nonlabored respirations               Cardiovascular: RRR, no murmurs, rubs, or gallops, palpable pedal pulses bilaterally              Gastrointestinal: Right lower quadrant abdominal pain, positive bowel sounds, soft, nondistended              Musculoskeletal: Lower extremity edema with chronic lymphedema, no clubbing or cyanosis to extremities              Psychiatric: Appropriate affect, cooperative              Neurologic: Oriented x 3, strength symmetric in all extremities, Cranial Nerves grossly intact to confrontation, speech clear              Skin: No rashes      Discharge Details        Discharge Medications        New Medications        Instructions Start Date   amoxicillin-clavulanate 875-125 MG per tablet  Commonly known as: AUGMENTIN   1 tablet, Oral, Every 12 Hours Scheduled      saccharomyces boulardii 250 MG capsule  Commonly known as: FLORASTOR   250 mg, Oral, 2 Times Daily             Changes to Medications        Instructions Start Date   ondansetron ODT 4 MG disintegrating tablet  Commonly known as: ZOFRAN-ODT  What changed: how much to take   4 mg, Oral, Every 8 Hours PRN             Continue These Medications        Instructions Start Date   albuterol sulfate  (90 Base) MCG/ACT inhaler  Commonly known as: PROVENTIL HFA;VENTOLIN HFA;PROAIR HFA   Inhale 2 puffs Every 4 (Four) Hours As Needed.      cetirizine 10 MG tablet  Commonly known as: zyrTEC   10 mg, Daily      diclofenac 75 MG EC tablet  Commonly known as: VOLTAREN   75 mg, Oral, 2 Times Daily      furosemide 20 MG tablet  Commonly known as: LASIX   1 tablet, 2 Times Daily      HYDROcodone-acetaminophen 7.5-325 MG per tablet  Commonly known as: NORCO   1 tablet, 4 Times Daily       hydrOXYzine 25 MG tablet  Commonly known as: ATARAX   25-50 mg, Oral, Every 8 Hours PRN      ipratropium-albuterol 0.5-2.5 mg/3 ml nebulizer  Commonly known as: DUO-NEB   3 mL, Nebulization, Every 6 Hours PRN      levothyroxine 50 MCG tablet  Commonly known as: SYNTHROID, LEVOTHROID   1 tablet, Daily      LORazepam 1 MG tablet  Commonly known as: ATIVAN   Take 1 tablet by mouth 2 (Two) Times a Day.      LORazepam 1 MG tablet  Commonly known as: ATIVAN   1 mg, Oral, 2 Times Daily PRN      nystatin 870625 UNIT/GM cream  Commonly known as: MYCOSTATIN   1 Application, 2 Times Daily PRN      nystatin [id number] UNIT/GM powder  Commonly known as: MYCOSTATIN   1 Application, 4 Times Daily PRN      traZODone 50 MG tablet  Commonly known as: DESYREL    mg, Oral, Nightly PRN               Allergies   Allergen Reactions   • Bee Venom Anaphylaxis   • Fd&C Yellow #5 (Tartrazine) Anaphylaxis     Patient states that she tolerates Keflex and Diflucan well by taking over-the-counter Benadryl along with the medications.   • Neeta Anaphylaxis and Other (See Comments)   • Iodinated Contrast Media Anaphylaxis, Itching and Other (See Comments)   • Iodine Anaphylaxis   • Meloxicam Shortness Of Breath and Swelling   • Red Dye #40 (Allura Red) Shortness Of Breath   • Methocarbamol Nausea And Vomiting   • Oxycodone-Acetaminophen Itching, Swelling and Other (See Comments)   • Famotidine Other (See Comments)     Pt experienced burning in vein and redness of arm above IV site   • Food Color Red Other (See Comments)   • Gluten Meal Nausea Only   • Shellfish Allergy Other (See Comments)   • Sumatriptan Swelling   • Tramadol Other (See Comments)   • Tramadol Hcl Other (See Comments)     ULCERS IN MOUTH   • Adhesive Tape Rash       Discharge Disposition:  Home or Self Care    Diet:  Hospital:  Diet Order   Procedures   • Diet: Gastrointestinal; Low Irritant; Fluid Consistency: Thin (IDDSI 0)       Discharge Activity:       CODE STATUS:  Code  Status and Medical Interventions: CPR (Attempt to Resuscitate); Full Support   Ordered at: 01/18/25 0610     Level Of Support Discussed With:    Patient     Code Status (Patient has no pulse and is not breathing):    CPR (Attempt to Resuscitate)     Medical Interventions (Patient has pulse or is breathing):    Full Support         No future appointments.    Additional Instructions for the Follow-ups that You Need to Schedule       Discharge Follow-up with PCP   As directed       Currently Documented PCP:    Janki Bolanos APRN    PCP Phone Number:    [phone]     Follow Up Details: 3 to 7 days                Pertinent  and/or Most Recent Results     PROCEDURES:   CT Abdomen Pelvis Without Contrast    Result Date: 1/18/2025  CT ABDOMEN PELVIS WO CONTRAST Date of Exam: 1/18/2025 3:13 AM EST Indication: Left lower quadrant abdominal pain. Comparison: 7/7/2024 Technique: Axial CT images were obtained of the abdomen and pelvis without the administration of contrast. Reconstructed coronal and sagittal images were also obtained. Automated exposure control and iterative construction methods were used. Findings: There is mild scarring/atelectasis in the lung bases. Abdominal aorta is normal in caliber. Gallbladder is surgically absent. No biliary obstruction. There is fatty atrophy of the pancreas. The liver remains enlarged. Solid abdominal organs are otherwise  normal. No renal or ureteral stones. No hydronephrosis. Urinary bladder is normal. Uterus is surgically absent. Again seen is a cystic lesion about the vaginal cuff, probably of ovarian origin. It measures about 4.3 x 5.8 cm and is probably not significantly changed. Once again, this can be assessed with nonemergent pelvic ultrasound. There is severe acute diverticulitis involving the proximal to mid sigmoid colon with adjacent inflammatory stranding and fluid. No clear evidence of perforation. The appendix is not definitely identified, but there is no  evidence of acute appendicitis. No small bowel obstruction is seen. Stomach is normal except for a small hiatal hernia. Few mildly prominent periportal lymph nodes are unchanged. No retroperitoneal adenopathy.     1.Severe acute diverticulitis of the proximal to mid sigmoid colon. No clear evidence of perforation. 2.Stable cystic lesion about the vaginal cuff, probably of ovarian origin. Once again, this can be assessed with nonemergent pelvic ultrasound. 3.Hepatomegaly. 4.No renal or ureteral stones. No hydronephrosis. 5.Hysterectomy and cholecystectomy. Electronically Signed: Ayo Parish MD  1/18/2025 3:25 AM EST  Workstation ID: [id number]       LAB RESULTS:      Lab 01/20/25 0442 01/19/25 0410 01/18/25 0122   WBC 8.75 9.60 11.49*   HEMOGLOBIN 11.4* 11.0* 12.5   HEMATOCRIT 37.1 34.8 39.1   PLATELETS 322 335 393   NEUTROS ABS 6.31 7.15* 9.40*   IMMATURE GRANS (ABS) 0.06* 0.05 0.06*   LYMPHS ABS 1.71 1.55 1.28   MONOS ABS 0.51 0.72 0.53   EOS ABS 0.12 0.09 0.18   MCV 85.7 84.9 84.8   LACTATE  --   --  1.8         Lab 01/20/25 0442 01/19/25 0410 01/18/25 0122   SODIUM 139 135* 140   POTASSIUM 3.1* 3.7 3.5   CHLORIDE 102 101 104   CO2 25.0 23.7 24.3   ANION GAP 12.0 10.3 11.7   BUN 6 10 11   CREATININE 0.74 0.84 0.78   EGFR 97.5 83.7 91.5   GLUCOSE 107* 110* 120*   CALCIUM 8.2* 8.0* 8.8   MAGNESIUM 1.9 1.9  --    PHOSPHORUS 2.6 2.9  --          Lab 01/20/25 0442 01/19/25 0410 01/18/25  0122   TOTAL PROTEIN 6.5 6.4 7.3   ALBUMIN 3.3* 3.0* 3.6   GLOBULIN  --   --  3.7   ALT (SGPT) 11 11 18   AST (SGOT) 13 16 24   BILIRUBIN 1.1 1.1 0.5   INDIRECT BILIRUBIN 0.7 0.9  --    BILIRUBIN DIRECT 0.4* 0.2  --    ALK PHOS 99 94 115   LIPASE  --   --  31                     Brief Urine Lab Results  (Last result in the past 365 days)        Color   Clarity   Blood   Leuk Est   Nitrite   Protein   CREAT   Urine HCG        01/18/25 0208 Yellow   Cloudy   Small (1+)   Moderate (2+)   Negative   Trace                  Microbiology Results (last 10 days)       Procedure Component Value - Date/Time    Blood Culture - Blood, Arm, Left [[id number]]  (Normal) Collected: 01/18/25 0523    Lab Status: Preliminary result Specimen: Blood from Arm, Left Updated: 01/20/25 0530     Blood Culture No growth at 2 days    Narrative:      Less than seven (7) mL's of blood was collected.  Insufficient quantity may yield false negative results.    Blood Culture - Blood, Arm, Right [[id number]]  (Normal) Collected: 01/18/25 0523    Lab Status: Preliminary result Specimen: Blood from Arm, Right Updated: 01/20/25 0530     Blood Culture No growth at 2 days    Narrative:      Less than seven (7) mL's of blood was collected.  Insufficient quantity may yield false negative results.            CT Abdomen Pelvis Without Contrast    Result Date: 1/18/2025  Impression: 1.Severe acute diverticulitis of the proximal to mid sigmoid colon. No clear evidence of perforation. 2.Stable cystic lesion about the vaginal cuff, probably of ovarian origin. Once again, this can be assessed with nonemergent pelvic ultrasound. 3.Hepatomegaly. 4.No renal or ureteral stones. No hydronephrosis. 5.Hysterectomy and cholecystectomy. Electronically Signed: Ayo Parish MD  1/18/2025 3:25 AM EST  Workstation ID: [id number]     Results for orders placed during the hospital encounter of 08/11/23    Duplex venous upper extremity left CAR    Interpretation Summary  •  Normal left upper extremity venous duplex scan.      Results for orders placed during the hospital encounter of 08/11/23    Duplex venous upper extremity left CAR    Interpretation Summary  •  Normal left upper extremity venous duplex scan.          Labs Pending at Discharge:  Pending Labs       Order Current Status    Blood Culture - Blood, Arm, Left Preliminary result    Blood Culture - Blood, Arm, Right Preliminary result              Time spent on Discharge including face to face service:  35 minutes    Electronically  signed by Valentino Arzola MD, 01/20/25, 11:47 AM EST.    Portions of this documentation were transcribed electronically from a voice recognition software.  I confirm all data accurately represents the service(s) I performed at today's visit.

## 2025-01-20 NOTE — PLAN OF CARE
Goal Outcome Evaluation:  Plan of Care Reviewed With: patient           Outcome Evaluation: Alert and oriented x4, able to make needs known. On room air, wears CPAP to sleep. Blood glucose monitored. IV antibiotics changed to PO antibiotics. Giving potassium replacement IV. Plan to discharge after potassium is done running. Call light within reach.     Vanessa Irving RN

## 2025-01-20 NOTE — PLAN OF CARE
Goal Outcome Evaluation:  Plan of Care Reviewed With: patient        Progress: improving  Outcome Evaluation: Alert and oriented times 4, IV antibiotics infused, blood glucose 116, medicated for pain times 1, CPAP prn, no new complaints, continue plan of care

## 2025-01-20 NOTE — PLAN OF CARE
Goal Outcome Evaluation:      Patient is very compliant with CPAP use.  Currently at a set pressure of [id number] and 21% FIO2.  Patient is on room air when not wearing CPAP.

## 2025-01-21 LAB — GLUCOSE BLDC GLUCOMTR-MCNC: 115 MG/DL (ref 70–99)

## 2025-01-21 NOTE — OUTREACH NOTE
Prep Survey      Flowsheet Row Responses   [facility] facility patient discharged from? [facility]   Is LACE score < 7 ? No   Eligibility Readm Mgmt   Discharge diagnosis Diverticulitis   Does the patient have one of the following disease processes/diagnoses(primary or secondary)? Other   Does the patient have Home health ordered? No   Is there a DME ordered? No   Prep survey completed? Yes            BEN SIERRA - Registered Nurse

## 2025-01-23 LAB
BACTERIA SPEC AEROBE CULT: NORMAL
BACTERIA SPEC AEROBE CULT: NORMAL

## 2025-01-31 ENCOUNTER — READMISSION MANAGEMENT (OUTPATIENT)
Dept: CALL CENTER | Facility: HOSPITAL | Age: 55
End: 2025-01-31
Payer: MEDICARE

## 2025-01-31 NOTE — OUTREACH NOTE
Medical Week 2 Survey      Flowsheet Row Responses   [facility] facility patient discharged from? [facility]   Does the patient have one of the following disease processes/diagnoses(primary or secondary)? Other   Week 2 attempt successful? No   Unsuccessful attempts Attempt 1            Ria ORDONEZ - Registered Nurse

## 2025-02-03 ENCOUNTER — TELEPHONE (OUTPATIENT)
Dept: ORTHOPEDIC SURGERY | Facility: CLINIC | Age: 55
End: 2025-02-03

## 2025-02-04 ENCOUNTER — READMISSION MANAGEMENT (OUTPATIENT)
Dept: CALL CENTER | Facility: HOSPITAL | Age: 55
End: 2025-02-04
Payer: MEDICARE

## 2025-02-04 NOTE — OUTREACH NOTE
Medical Week 2 Survey      Flowsheet Row Responses   [facility] patient discharged from? [facility]   Does the patient have one of the following disease processes/diagnoses(primary or secondary)? Other   Week 2 attempt successful? Yes   Call start time 1548   Discharge diagnosis Diverticulitis   Call end time 1555   Person spoke with today (if not patient) and relationship pt   Meds reviewed with patient/caregiver? Yes   Is the patient having any side effects they believe may be caused by any medication additions or changes? No   Does the patient have all medications ordered at discharge? Yes   Is the patient taking all medications as directed (includes completed medication regime)? Yes   Does the patient have a primary care provider?  Yes   Does the patient have an appointment with their PCP within 7 days of discharge? Yes   Has the patient kept scheduled appointments due by today? Yes   Psychosocial issues? No   Did the patient receive a copy of their discharge instructions? Yes   Nursing interventions Reviewed instructions with patient   What is the patient's perception of their health status since discharge? Same  [vomiting and diarrhea yellow]   Is the patient/caregiver able to teach back signs and symptoms related to disease process for when to call PCP? No   Is the patient/caregiver able to teach back signs and symptoms related to disease process for when to call 911? No   Is the patient/caregiver able to teach back the hierarchy of who to call/visit for symptoms/problems? PCP, Specialist, Home health nurse, Urgent Care, ED, 911 No   If the patient is a current smoker, are they able to teach back resources for cessation? Not a smoker   Week 2 Call Completed? Yes   Is the patient interested in additional calls from an ambulatory ? No   Would this patient benefit from a Referral to Amb Social Work? No   Wrap up additional comments may end up in ER tonight Saw her pcp and they did labs, Her pcp  told her she would call her by 6pm if she had to go back to ER   Call end time [id number]            DEBBY TSAI - Registered Nurse

## 2025-02-06 ENCOUNTER — TELEPHONE (OUTPATIENT)
Dept: ORTHOPEDIC SURGERY | Facility: CLINIC | Age: 55
End: 2025-02-06
Payer: MEDICARE

## 2025-02-06 NOTE — TELEPHONE ENCOUNTER
----- Message from Isela YEE sent at 2/4/2025 11:18 AM EST -----  No PA Required for bilateral knee Synvisc One.  Okay to schedule when appropriate.

## 2025-02-06 NOTE — TELEPHONE ENCOUNTER
APPT: 2-10 AT 3:00 PM ANA PAULA KNEE SYNVISC ONE WITH [facility] AT [facility]    LAST ANA PAULA KNEE STEROID INJ:  7-26    [employer] MEDICARE=NO PA REQ

## 2025-02-12 ENCOUNTER — READMISSION MANAGEMENT (OUTPATIENT)
Dept: CALL CENTER | Facility: HOSPITAL | Age: 55
End: 2025-02-12
Payer: MEDICARE

## 2025-02-12 NOTE — OUTREACH NOTE
Medical Week 3 Survey      Flowsheet Row Responses   [facility] patient discharged from? [facility]   Does the patient have one of the following disease processes/diagnoses(primary or secondary)? Other   Week 3 attempt successful? Yes   Call start time 1659   Call end time 1701   Discharge diagnosis Diverticulitis   Meds reviewed with patient/caregiver? Yes   Is the patient having any side effects they believe may be caused by any medication additions or changes? No   Does the patient have all medications ordered at discharge? Yes   Is the patient taking all medications as directed (includes completed medication regime)? Yes   Does the patient have a primary care provider?  Yes   Does the patient have an appointment with their PCP within 7 days of discharge? Yes   Has the patient kept scheduled appointments due by today? Yes   Has home health visited the patient within 72 hours of discharge? N/A   Psychosocial issues? No   Did the patient receive a copy of their discharge instructions? Yes   Nursing interventions Reviewed instructions with patient   What is the patient's perception of their health status since discharge? Same   Is the patient/caregiver able to teach back signs and symptoms related to disease process for when to call PCP? Yes   Is the patient/caregiver able to teach back signs and symptoms related to disease process for when to call 911? Yes   Is the patient/caregiver able to teach back the hierarchy of who to call/visit for symptoms/problems? PCP, Specialist, Home health nurse, Urgent Care, ED, 911 Yes   Additional teach back comments has pain after eating   Week 3 Call Completed? Yes   Graduated Yes   Is the patient interested in additional calls from an ambulatory ? No   Would this patient benefit from a Referral to Amb Social Work? No   Call end time 1701            Hanane ORTEGA - Registered Nurse

## 2025-02-20 ENCOUNTER — TELEPHONE (OUTPATIENT)
Dept: GASTROENTEROLOGY | Facility: CLINIC | Age: 55
End: 2025-02-20
Payer: MEDICARE

## 2025-02-20 NOTE — TELEPHONE ENCOUNTER
After review by the MA this patient from the ER visit would need to be worked in the next two weeks. Patient was scheduled for a new patient appointment in May with Elizabet Sanchez. Reviewed scheduled and she had a cancellation for 03/05/2025 at 10:00 am. Contacted patient and made her aware of the sooner appointment. Rescheduled from May to March and this will show in her [employer] account.

## 2025-03-05 ENCOUNTER — OFFICE VISIT (OUTPATIENT)
Dept: GASTROENTEROLOGY | Facility: CLINIC | Age: 55
End: 2025-03-05
Payer: MEDICARE

## 2025-03-05 VITALS
HEIGHT: 68 IN | BODY MASS INDEX: 44.41 KG/M2 | SYSTOLIC BLOOD PRESSURE: 154 MMHG | DIASTOLIC BLOOD PRESSURE: 69 MMHG | WEIGHT: 293 LBS | HEART RATE: 70 BPM

## 2025-03-05 DIAGNOSIS — R10.12 LEFT UPPER QUADRANT ABDOMINAL PAIN: ICD-10-CM

## 2025-03-05 DIAGNOSIS — K59.04 CHRONIC IDIOPATHIC CONSTIPATION: ICD-10-CM

## 2025-03-05 DIAGNOSIS — K21.00 GASTROESOPHAGEAL REFLUX DISEASE WITH ESOPHAGITIS WITHOUT HEMORRHAGE: ICD-10-CM

## 2025-03-05 DIAGNOSIS — R11.0 NAUSEA: ICD-10-CM

## 2025-03-05 DIAGNOSIS — K57.92 DIVERTICULITIS: Primary | ICD-10-CM

## 2025-03-05 DIAGNOSIS — K20.90 ESOPHAGITIS: ICD-10-CM

## 2025-03-05 RX ORDER — FLUCONAZOLE 150 MG/1
TABLET ORAL
COMMUNITY
Start: 2025-02-28

## 2025-03-05 RX ORDER — MONTELUKAST SODIUM 10 MG/1
10 TABLET ORAL DAILY
COMMUNITY
Start: 2025-02-04

## 2025-03-05 RX ORDER — METRONIDAZOLE 500 MG/1
TABLET ORAL
COMMUNITY
Start: 2025-02-14

## 2025-03-05 RX ORDER — PRAVASTATIN SODIUM 20 MG
20 TABLET ORAL DAILY
COMMUNITY
Start: 2025-02-04

## 2025-03-05 RX ORDER — SODIUM, POTASSIUM,MAG SULFATES 17.5-3.13G
2 SOLUTION, RECONSTITUTED, ORAL ORAL TAKE AS DIRECTED
Qty: 177 ML | Refills: 0 | Status: SHIPPED | OUTPATIENT
Start: 2025-03-05

## 2025-03-05 RX ORDER — POLYETHYLENE GLYCOL 3350 17 G/17G
17 POWDER, FOR SOLUTION ORAL DAILY
Qty: 527 G | Refills: 1 | Status: SHIPPED | OUTPATIENT
Start: 2025-03-05

## 2025-03-05 NOTE — PROGRESS NOTES
Chief Complaint     Irritable Bowel Syndrome, Vomiting, and Diverticulitis    History of Present Illness     Mina Rivera is a 54 y.o. female who presents to [facility] on referral from Valentino COLÓN MD for a gastroenterology evaluation of hosp. F/u fo rdiverticuls.  .      PMH of diabetes, hypertension, dyslipidemia, LASHA, asthma, paroxysmal atrial fibrillation not on anticoagulation, obesity, IBS, fibromyalgia, GERD without esophagitis, anxiety, depression, diabetic peripheral neuropathy, history of DVT, chronic lymphedema of lower extremities, RA, hospitalized on 1/18/2025 with chief complaint of abdominal pain, with elevated white blood cell count with urinalysis suggestive of UTI with CT abdomen showing severe acute diverticulitis in the proximal to mid sigmoid colon, placed on IV antibiotics, positive response to antibiotic regimen, diet reintroduced and slowly advanced.  Discharged on 1/20/25.      She reports pain on left side that she describes as constant until she has a bowel movement.  She hasn't had a bowel movement in a few days.  Reports that pain is worse after she eats.  She ate Pashto toast, perez and hashbrown casserole about an hour ago and reports that she's had pain since eating.      Baseline bowel pattern is diarrhea several times per day.  She will also experience episodes of passing harder stool and the several more liquid bowel movements following this.      Previous EGD/Colonoscopy in 2018 at [facility] for H.pylori.  Diverticulosis noted, no polyps.      Reports vomiting yellow bile each morning.  States that she wakes feeling nauseated, vomits and then nausea is improved.  She is currently not taking an acid reducer.  States that acid reflux depends on the day.  She's previously tried Protonix, omeprazole, famotidine, lansoprazole, esomeprazole, and states that only name brand Nexium works for her and it is not covered and it was $1800 at the  pharmacy and she can't afford that.  She's unable to take famotidine due to it containing red dye.         History      Past Medical History:   Diagnosis Date    A-fib     Asthma     Diabetes mellitus     Hypertension     Lymphedema     Migraine     Murmur     Sleep apnea        Past Surgical History:   Procedure Laterality Date    CHOLECYSTECTOMY      FOOT SURGERY      HYSTERECTOMY      INCONTINENCE SURGERY         Family History   Problem Relation Age of Onset    Colon cancer Father         Current Medications        Current Outpatient Medications:     albuterol sulfate  (90 Base) MCG/ACT inhaler, Inhale 2 puffs Every 4 (Four) Hours As Needed., Disp: , Rfl:     diclofenac (VOLTAREN) 75 MG EC tablet, TAKE 1 TABLET BY MOUTH TWICE DAILY, Disp: 60 tablet, Rfl: 2    fluconazole (DIFLUCAN) 150 MG tablet, , Disp: , Rfl:     furosemide (LASIX) 20 MG tablet, Take 1 tablet by mouth 2 (Two) Times a Day., Disp: , Rfl:     HYDROcodone-acetaminophen (NORCO) 7.5-325 MG per tablet, Take 1 tablet by mouth 4 (Four) Times a Day., Disp: , Rfl:     hydrOXYzine (ATARAX) 25 MG tablet, Take 1-2 tablets by mouth Every 8 (Eight) Hours As Needed for Anxiety., Disp: , Rfl:     ipratropium-albuterol (DUO-NEB) 0.5-2.5 mg/3 ml nebulizer, Take 3 mL by nebulization Every 6 (Six) Hours As Needed for Wheezing., Disp: , Rfl:     levothyroxine (SYNTHROID, LEVOTHROID) 50 MCG tablet, Take 1 tablet by mouth Daily., Disp: , Rfl:     LORazepam (ATIVAN) 1 MG tablet, Take 1 tablet by mouth 2 (Two) Times a Day., Disp: , Rfl:     LORazepam (ATIVAN) 1 MG tablet, Take 1 tablet by mouth 2 (Two) Times a Day As Needed for Anxiety (this is in addition to the BID scheduled dose. Max Daily Dose of 4 mg)., Disp: , Rfl:     metroNIDAZOLE (FLAGYL) 500 MG tablet, , Disp: , Rfl:     montelukast (SINGULAIR) 10 MG tablet, Take 1 tablet by mouth Daily., Disp: , Rfl:     nystatin (MYCOSTATIN) 363472 UNIT/GM cream, Apply 1 Application topically to the appropriate area  as directed 2 (Two) Times a Day As Needed., Disp: , Rfl:     nystatin (MYCOSTATIN) 830482 UNIT/GM powder, Apply 1 Application topically to the appropriate area as directed 4 (Four) Times a Day As Needed (Skin folds)., Disp: , Rfl:     ondansetron ODT (ZOFRAN-ODT) 4 MG disintegrating tablet, Take 1 tablet by mouth Every 8 (Eight) Hours As Needed for Nausea or Vomiting. (Patient taking differently: Take 2 tablets by mouth Every 8 (Eight) Hours As Needed for Nausea or Vomiting.), Disp: 10 tablet, Rfl: 0    pravastatin (PRAVACHOL) 20 MG tablet, Take 1 tablet by mouth Daily., Disp: , Rfl:     traZODone (DESYREL) 50 MG tablet, Take 1-2 tablets by mouth At Night As Needed for Sleep., Disp: , Rfl:     cetirizine (zyrTEC) 10 MG tablet, Take 1 tablet by mouth Daily., Disp: , Rfl:     polyethylene glycol (MIRALAX) 17 GM/SCOOP powder, Take 17 g by mouth Daily., Disp: 527 g, Rfl: 1    sodium-potassium-magnesium sulfates (Suprep Bowel Prep Kit) 17.5-3.13-1.6 GM/177ML solution oral solution, Take 2 bottles by mouth Take As Directed., Disp: 177 mL, Rfl: 0     Allergies     Allergies   Allergen Reactions    Bee Venom Anaphylaxis    Fd&C Yellow #5 (Tartrazine) Anaphylaxis     Patient states that she tolerates Keflex and Diflucan well by taking over-the-counter Benadryl along with the medications.    Ginger Anaphylaxis and Other (See Comments)    Iodinated Contrast Media Anaphylaxis, Itching and Other (See Comments)    Iodine Anaphylaxis    Meloxicam Shortness Of Breath and Swelling    Red Dye #40 (Allura Red) Shortness Of Breath    Methocarbamol Nausea And Vomiting    Oxycodone-Acetaminophen Itching, Swelling and Other (See Comments)    Famotidine Other (See Comments)     Pt experienced burning in vein and redness of arm above IV site    Food Color Red Other (See Comments)    Gluten Meal Nausea Only    Shellfish Allergy Other (See Comments)    Sumatriptan Swelling    Tramadol Other (See Comments)    Tramadol Hcl Other (See Comments)  "    ULCERS IN MOUTH    Adhesive Tape Rash       Social History       Social History     Social History Narrative    Not on file       Immunizations     Immunization:  Immunization History   Administered Date(s) Administered    COVID-19 (PFIZER) Purple Cap Monovalent 04/21/2021, 05/12/2021          Objective     Objective     Vital Signs:   /69 (BP Location: Left arm, Patient Position: Sitting, Cuff Size: Adult)   Pulse 70   Ht 171.5 cm (67.52\")   Wt (!) 176 kg (388 lb)   BMI 59.84 kg/m²       Physical Exam  Constitutional:       General: She is not in acute distress.     Appearance: Normal appearance. She is well-developed and normal weight.   HENT:      Head: Normocephalic and atraumatic.   Eyes:      Conjunctiva/sclera: Conjunctivae normal.      Pupils: Pupils are equal, round, and reactive to light.      Visual Fields: Right eye visual fields normal and left eye visual fields normal.   Cardiovascular:      Rate and Rhythm: Normal rate.   Pulmonary:      Effort: Pulmonary effort is normal. No respiratory distress or retractions.      Breath sounds: Normal air entry.   Abdominal:      General: There is no distension.      Tenderness: There is abdominal tenderness (left side).   Musculoskeletal:         General: Normal range of motion.      Right lower leg: No edema.      Left lower leg: No edema.   Skin:     General: Skin is warm and dry.      Findings: No lesion.   Neurological:      General: No focal deficit present.      Mental Status: She is alert and oriented to person, place, and time.   Psychiatric:         Mood and Affect: Mood and affect normal.         Behavior: Behavior normal.         Results      Result Review :   The following data was reviewed by: BIJAL Clark on 03/05/2025:    CBC w/diff          1/18/2025    01:22 1/19/2025    04:10 1/20/2025    04:42   CBC w/Diff   WBC 11.49  9.60  8.75    RBC 4.61  4.10  4.33    Hemoglobin 12.5  11.0  11.4    Hematocrit 39.1  34.8  37.1  "   MCV 84.8  84.9  85.7    MCH 27.1  26.8  26.3    MCHC 32.0  31.6  30.7    RDW 15.7  15.7  15.3    Platelets 393  335  322    Neutrophil Rel % 81.9  74.6  72.1    Immature Granulocyte Rel % 0.5  0.5  0.7    Lymphocyte Rel % 11.1  16.1  19.5    Monocyte Rel % 4.6  7.5  5.8    Eosinophil Rel % 1.6  0.9  1.4    Basophil Rel % 0.3  0.4  0.5      CMP          1/18/2025    01:22 1/19/2025    04:10 1/20/2025    04:42   CMP   Glucose 120  110  107    BUN 11  10  6    Creatinine 0.78  0.84  0.74    EGFR 91.5  83.7  97.5    Sodium 140  135  139    Potassium 3.5  3.7  3.1    Chloride 104  101  102    Calcium 8.8  8.0  8.2    Total Protein 7.3  6.4  6.5    Albumin 3.6  3.0  3.3    Globulin 3.7      Total Bilirubin 0.5  1.1  1.1    Alkaline Phosphatase 115  94  99    AST (SGOT) 24  16  13    ALT (SGPT) 18  11  11    Albumin/Globulin Ratio 1.0      BUN/Creatinine Ratio 14.1  11.9  8.1    Anion Gap 11.7  10.3  12.0        Lipase   Lipase   Date Value Ref Range Status   01/18/2025 31 13 - 60 U/L Final   05/14/2021 41 (H) 4 - 39 U/L Final     Amylase   Amylase   Date Value Ref Range Status   12/05/2018 26 (L) 30 - 110 U/L Final            Assessment and Plan        Assessment and Plan    Diagnoses and all orders for this visit:    1. Diverticulitis (Primary)  -     Case Request; Standing  -     Case Request    2. Chronic idiopathic constipation  -     Case Request; Standing  -     Case Request  -     polyethylene glycol (MIRALAX) 17 GM/SCOOP powder; Take 17 g by mouth Daily.  Dispense: 527 g; Refill: 1    3. Left upper quadrant abdominal pain  -     Case Request; Standing  -     Case Request    4. Esophagitis  -     Case Request; Standing  -     Case Request    5. Gastroesophageal reflux disease with esophagitis without hemorrhage  -     Case Request; Standing  -     Case Request    6. Nausea  -     Case Request; Standing  -     Case Request    Other orders  -     Follow Anesthesia Guidelines / Protocol; Future  -     Verify NPO;  Standing  -     Verify Bowel Prep Was Successful; Standing  -     Give Tap Water Enema If Bowel Prep Insufficient; Standing  -     sodium-potassium-magnesium sulfates (Suprep Bowel Prep Kit) 17.5-3.13-1.6 GM/177ML solution oral solution; Take 2 bottles by mouth Take As Directed.  Dispense: 177 mL; Refill: 0        ESOPHAGOGASTRODUODENOSCOPY (N/A), COLONOSCOPY (N/A)  The risk of the endoscopy were discussed in detail. Possible risks/complications, benefits, and alternatives to surgical or invasive procedure have been explained to patient and/or legal guardian; risks include bleeding, infection, and perforation. Patient has been evaluated and can tolerate anesthesia and/or sedation.       Follow Up        Follow Up   Return in about 6 months (around 9/5/2025) for abdominal pain, GERD and diverticulitis .  Patient was given instructions and counseling regarding her condition or for health maintenance advice. Please see specific information pulled into the AVS if appropriate.

## 2025-03-07 ENCOUNTER — OFFICE VISIT (OUTPATIENT)
Dept: ORTHOPEDIC SURGERY | Facility: CLINIC | Age: 55
End: 2025-03-07
Payer: MEDICARE

## 2025-03-07 VITALS
HEART RATE: 78 BPM | BODY MASS INDEX: 44.41 KG/M2 | OXYGEN SATURATION: 93 % | HEIGHT: 68 IN | SYSTOLIC BLOOD PRESSURE: 165 MMHG | WEIGHT: 293 LBS | DIASTOLIC BLOOD PRESSURE: 97 MMHG

## 2025-03-07 DIAGNOSIS — M25.561 RIGHT KNEE PAIN, UNSPECIFIED CHRONICITY: ICD-10-CM

## 2025-03-07 DIAGNOSIS — M17.0 BILATERAL PRIMARY OSTEOARTHRITIS OF KNEE: ICD-10-CM

## 2025-03-07 DIAGNOSIS — M25.562 LEFT KNEE PAIN, UNSPECIFIED CHRONICITY: ICD-10-CM

## 2025-03-07 NOTE — PROGRESS NOTES
Chief Complaint  Follow-up of the Right Knee and Follow-up of the Left Knee    Subjective          History of Present Illness      Mina Rivera is a 54 y.o. female  presents to [facility] for     Patient presents for follow-up evaluation of bilateral knee pain and bilateral knee osteoarthritis.  She has not been seen since July 2024 due to an insurance issue.  She has continued to have pain in her knees at her last visit she received steroid injections.  She states they are helpful.  We tried to get viscosupplementation approved and she is here to receive bilateral Synvisc injections.  She states the right knee hurts more than the left she points to the anterior medial and lateral knees as her areas of pain, she denies new injury or symptoms of pain.      Allergies   Allergen Reactions    Bee Venom Anaphylaxis    Fd&C Yellow #5 (Tartrazine) Anaphylaxis     Patient states that she tolerates Keflex and Diflucan well by taking over-the-counter Benadryl along with the medications.    Neeta Anaphylaxis and Other (See Comments)    Iodinated Contrast Media Anaphylaxis, Itching and Other (See Comments)    Iodine Anaphylaxis    Meloxicam Shortness Of Breath and Swelling    Red Dye #40 (Allura Red) Shortness Of Breath    Methocarbamol Nausea And Vomiting    Oxycodone-Acetaminophen Itching, Swelling and Other (See Comments)    Famotidine Other (See Comments)     Pt experienced burning in vein and redness of arm above IV site    Food Color Red Other (See Comments)    Gluten Meal Nausea Only    Shellfish Allergy Other (See Comments)    Sumatriptan Swelling    Tramadol Other (See Comments)    Tramadol Hcl Other (See Comments)     ULCERS IN MOUTH    Adhesive Tape Rash        Social History     Socioeconomic History    Marital status:    Tobacco Use    Smoking status: Never    Smokeless tobacco: Never   Vaping Use    Vaping status: Never Used   Substance and Sexual Activity    Alcohol use:  "Never    Drug use: Never    Sexual activity: Defer        REVIEW OF SYSTEMS    Constitutional: Awake alert and oriented x3, no acute distress, denies fevers, chills, weight loss  Respiratory: No respiratory distress  Vascular: Brisk cap refill, Intact distal pulses, No cyanosis, compartments soft with no signs or symptoms of compartment syndrome or DVT.   Cardiovascular: Denies chest pain, shortness of breath  Skin: Denies rashes, acute skin changes  Neurologic: Denies headache, loss of consciousness  MSK: Bilateral knee pain      Objective   Vital Signs:   /97   Pulse 78   Ht 171.5 cm (67.52\")   Wt (!) 176 kg (388 lb)   SpO2 93%   BMI 59.84 kg/m²     Body mass index is 59.84 kg/m².    Physical Exam       Right knee- well healed scope scars. ROM -5 to 100 degrees. Medial joint line tenderness. Stable to varus/valgus stress. Stable to anterior/posterior drawer. Negative Lachman's. Negative Silas's. Positive EHL, FHL, GS and TA. Sensation intact to all 5 nerves of the foot. Positive pulses.      Left knee- ROM -5 to 105 degrees. Medial joint line tenderness. Stable to varus/valgus stress. Stable to anterior/posterior drawer. Negative Lachman's. Negative Silas's. Positive EHL, FHL, GS and TA. Sensation intact to all 5 nerves of the foot. Positive pulses.       Large Joint: R knee  Date/Time: 3/7/2025 10:26 AM  Consent given by: patient  Site marked: site marked  Timeout: Immediately prior to procedure a time out was called to verify the correct patient, procedure, equipment, support staff and site/side marked as required   Supporting Documentation  Indications: pain   Procedure Details  Location: knee - R knee  Preparation: Patient was prepped and draped in the usual sterile fashion  Needle gauge: 21g.  Medications administered: 48 mg hylan 48 MG/6ML  Patient tolerance: patient tolerated the procedure well with no immediate complications      Large Joint: L knee  Date/Time: 3/7/2025 10:27 AM  Consent " given by: patient  Site marked: site marked  Timeout: Immediately prior to procedure a time out was called to verify the correct patient, procedure, equipment, support staff and site/side marked as required   Supporting Documentation  Indications: pain   Procedure Details  Location: knee - L knee  Preparation: Patient was prepped and draped in the usual sterile fashion  Needle gauge: 21g.  Medications administered: 48 mg hylan 48 MG/6ML  Patient tolerance: patient tolerated the procedure well with no immediate complications      This injection documentation was Scribed for EVY Dacosta by Aleah Weiner MA.  03/07/25   10:27 EST    Imaging Results (Most Recent)       None             Result Review :   The following data was reviewed by: VEY Dacosta on 03/07/2025:               Assessment and Plan    Diagnoses and all orders for this visit:    1. Right knee pain, unspecified chronicity (Primary)    2. Left knee pain, unspecified chronicity    3. Bilateral primary osteoarthritis of knee        Discussed diagnosis and treatment options with the patient she elected to have bilateral knee Synvisc injections.  A sterile prep of the injection site was performed with chloraprep. Cryospray was used for local anesthesia. The site was injected. The patient tolerated the procedure well without complications. Post injection pain was discussed.    The risks, benefits, complications, treatment options/alternatives, and expected outcomes/goals were discussed with the patient.   Follow-up 6 to 8 weeks    Call or return if worsening symptoms.    Follow Up   Return for Follow up 6-8 weeks.  Patient was given instructions and counseling regarding her condition or for health maintenance advice. Please see specific information pulled into the AVS if appropriate.       EMR Dragon/Transcription disclaimer:  Part of this note may be an electronic transcription/translation of spoken language to printed text  using the Dragon Dictation System

## 2025-03-19 ENCOUNTER — HOSPITAL ENCOUNTER (EMERGENCY)
Facility: HOSPITAL | Age: 55
Discharge: HOME | End: 2025-03-19
Attending: EMERGENCY MEDICINE
Payer: MEDICARE

## 2025-03-19 VITALS
TEMPERATURE: 97.7 F | HEIGHT: 67 IN | OXYGEN SATURATION: 96 % | BODY MASS INDEX: 45.99 KG/M2 | DIASTOLIC BLOOD PRESSURE: 98 MMHG | RESPIRATION RATE: 24 BRPM | SYSTOLIC BLOOD PRESSURE: 161 MMHG | WEIGHT: 293 LBS | HEART RATE: 87 BPM

## 2025-03-19 DIAGNOSIS — I89.0 LYMPHEDEMA: ICD-10-CM

## 2025-03-19 DIAGNOSIS — R21 RASH: Primary | ICD-10-CM

## 2025-03-19 DIAGNOSIS — B08.1 MOLLUSCUM CONTAGIOSUM: ICD-10-CM

## 2025-03-19 PROCEDURE — 63710000001 PREDNISONE PER 1 MG

## 2025-03-19 PROCEDURE — 99283 EMERGENCY DEPT VISIT LOW MDM: CPT

## 2025-03-19 RX ORDER — PREDNISONE 20 MG/1
40 TABLET ORAL ONCE
Status: COMPLETED | OUTPATIENT
Start: 2025-03-19 | End: 2025-03-19

## 2025-03-19 RX ORDER — HYDROXYZINE HYDROCHLORIDE 25 MG/1
50 TABLET, FILM COATED ORAL 3 TIMES DAILY PRN
Qty: 21 TABLET | Refills: 0 | Status: SHIPPED | OUTPATIENT
Start: 2025-03-19

## 2025-03-19 RX ORDER — HYDROXYZINE HYDROCHLORIDE 25 MG/1
50 TABLET, FILM COATED ORAL ONCE
Status: COMPLETED | OUTPATIENT
Start: 2025-03-19 | End: 2025-03-19

## 2025-03-19 RX ORDER — PREDNISONE 20 MG/1
TABLET ORAL
Qty: 18 TABLET | Refills: 0 | Status: SHIPPED | OUTPATIENT
Start: 2025-03-19 | End: 2025-03-28

## 2025-03-19 RX ADMIN — PREDNISONE 40 MG: 20 TABLET ORAL at 18:21

## 2025-03-19 RX ADMIN — HYDROXYZINE HYDROCHLORIDE 50 MG: 25 TABLET ORAL at 18:21

## 2025-03-19 NOTE — DISCHARGE INSTRUCTIONS
Follow up with your primary care provider. Return to ER if symptoms worsen or fail to improve.  Alternate Tylenol and Ibuprofen for pain. Do not exceed 2400mg of ibuprofen or 4g of Tylenol in 24 hours (600mg of ibuprofen every 6 hours and 1g of Tylenol every 6 hours).  Take steroid as prescribed beginning tomorrow.  Take Atarax as needed for itching.  Do not take Benadryl while also taking Atarax.  Keep legs wrapped with Ace wrap and elevate legs above the level of the heart as often as possible.

## 2025-03-19 NOTE — ED PROVIDER NOTES
Time: 5:40 PM EDT  Date of encounter:  3/19/2025  Independent Historian/Clinical History and Information was obtained by:   Patient    History is limited by: N/A    Chief Complaint: Rash      History of Present Illness:  Patient is a 54 y.o. year old female who presents to the emergency department for evaluation of bilateral lower extremity rash that began today.  Patient states that she has not changed any detergents, washes, or any other irritants.  Patient does have a history of lymphedema and has not been wearing her compression socks.  Patient denies any recent extraneous activities or outside work.  Patient took Benadryl around noon without relief.      Patient Care Team  Primary Care Provider: Janki Clayton MD    Past Medical History:     Allergies   Allergen Reactions    Bee Venom Anaphylaxis    Fd&C Yellow #5 (Tartrazine) Anaphylaxis     Patient states that she tolerates Keflex and Diflucan well by taking over-the-counter Benadryl along with the medications.    Neeta Anaphylaxis and Other (See Comments)    Iodinated Contrast Media Anaphylaxis, Itching and Other (See Comments)    Iodine Anaphylaxis    Meloxicam Shortness Of Breath and Swelling    Red Dye #40 (Allura Red) Shortness Of Breath    Methocarbamol Nausea And Vomiting    Oxycodone-Acetaminophen Itching, Swelling and Other (See Comments)    Famotidine Other (See Comments)     Pt experienced burning in vein and redness of arm above IV site    Food Color Red Other (See Comments)    Gluten Meal Nausea Only    Shellfish Allergy Other (See Comments)    Sumatriptan Swelling    Tramadol Other (See Comments)    Tramadol Hcl Other (See Comments)     ULCERS IN MOUTH    Adhesive Tape Rash     Past Medical History:   Diagnosis Date    A-fib     Asthma     Diabetes mellitus     Hypertension     Lymphedema     Migraine     Murmur     Sleep apnea      Past Surgical History:   Procedure Laterality Date    CHOLECYSTECTOMY      FOOT SURGERY      HYSTERECTOMY       INCONTINENCE SURGERY       Family History   Problem Relation Age of Onset    Colon cancer Father        Home Medications:  Prior to Admission medications    Medication Sig Start Date End Date Taking? Authorizing Provider   albuterol sulfate  (90 Base) MCG/ACT inhaler Inhale 2 puffs Every 4 (Four) Hours As Needed.    Ernestine Jackson MD   cetirizine (zyrTEC) 10 MG tablet Take 1 tablet by mouth Daily. 8/15/23 1/18/25  Ernestine Jackson MD   diclofenac (VOLTAREN) 75 MG EC tablet TAKE 1 TABLET BY MOUTH TWICE DAILY 12/16/24   Ben Herzog MD   fluconazole (DIFLUCAN) 150 MG tablet  2/28/25   Ernestine Jackson MD   furosemide (LASIX) 20 MG tablet Take 1 tablet by mouth 2 (Two) Times a Day. 10/31/24   Ernestine Jackson MD   HYDROcodone-acetaminophen (NORCO) 7.5-325 MG per tablet Take 1 tablet by mouth 4 (Four) Times a Day. 3/23/24   Ernestine Jackson MD   hydrOXYzine (ATARAX) 25 MG tablet Take 1-2 tablets by mouth Every 8 (Eight) Hours As Needed for Anxiety.    Ernestine Jackson MD   ipratropium-albuterol (DUO-NEB) 0.5-2.5 mg/3 ml nebulizer Take 3 mL by nebulization Every 6 (Six) Hours As Needed for Wheezing.    Ernestine Jackson MD   levothyroxine (SYNTHROID, LEVOTHROID) 50 MCG tablet Take 1 tablet by mouth Daily. 3/28/24   Ernestine Jackson MD   LORazepam (ATIVAN) 1 MG tablet Take 1 tablet by mouth 2 (Two) Times a Day. 2/21/24   Ernestine Jackson MD   LORazepam (ATIVAN) 1 MG tablet Take 1 tablet by mouth 2 (Two) Times a Day As Needed for Anxiety (this is in addition to the BID scheduled dose. Max Daily Dose of 4 mg).    Ernestine Jackson MD   metroNIDAZOLE (FLAGYL) 500 MG tablet  2/14/25   Ernestine Jackson MD   montelukast (SINGULAIR) 10 MG tablet Take 1 tablet by mouth Daily. 2/4/25   Ernestine Jackson MD   nystatin (MYCOSTATIN) 626539 UNIT/GM cream Apply 1 Application topically to the appropriate area as directed 2 (Two) Times a Day As Needed.     "Ernestine Jackson MD   nystatin (MYCOSTATIN) 171162 UNIT/GM powder Apply 1 Application topically to the appropriate area as directed 4 (Four) Times a Day As Needed (Skin folds).    Ernestine Jackson MD   ondansetron ODT (ZOFRAN-ODT) 4 MG disintegrating tablet Take 1 tablet by mouth Every 8 (Eight) Hours As Needed for Nausea or Vomiting.  Patient taking differently: Take 2 tablets by mouth Every 8 (Eight) Hours As Needed for Nausea or Vomiting. 7/7/24   Deann Yeh APRN   polyethylene glycol (MIRALAX) 17 GM/SCOOP powder Take 17 g by mouth Daily. 3/5/25   Carolyn Sanchez APRN   pravastatin (PRAVACHOL) 20 MG tablet Take 1 tablet by mouth Daily. 2/4/25   Ernestine Jackson MD   sodium-potassium-magnesium sulfates (Suprep Bowel Prep Kit) 17.5-3.13-1.6 GM/177ML solution oral solution Take 2 bottles by mouth Take As Directed. 3/5/25   Carolyn Sanchez APRN   traZODone (DESYREL) 50 MG tablet Take 1-2 tablets by mouth At Night As Needed for Sleep.    Ernestine Jackson MD        Social History:   Social History     Tobacco Use    Smoking status: Never    Smokeless tobacco: Never   Vaping Use    Vaping status: Never Used   Substance Use Topics    Alcohol use: Never    Drug use: Never         Review of Systems:  Review of Systems   Skin:  Positive for rash.        Physical Exam:  /98   Pulse 87   Temp 97.7 °F (36.5 °C) (Oral)   Resp 24   Ht 170.2 cm (67\")   Wt (!) 176 kg (388 lb 0.2 oz)   SpO2 96%   BMI 60.77 kg/m²     Physical Exam  HENT:      Head: Normocephalic.      Mouth/Throat:      Mouth: Mucous membranes are moist.   Eyes:      Pupils: Pupils are equal, round, and reactive to light.   Pulmonary:      Effort: Pulmonary effort is normal.   Abdominal:      General: There is no distension.   Musculoskeletal:      Cervical back: Neck supple.      Right lower leg: Swelling present. 1+ Edema present.      Left lower leg: Swelling present. 1+ Edema present.      Right foot: Normal " capillary refill. Normal pulse.      Left foot: Normal capillary refill. Normal pulse.   Skin:     General: Skin is warm and dry.      Findings: Erythema (Erythema, circumcontrol on left lower extremity) and rash (Petechial rash noted on bilateral lower extremity) present.   Neurological:      General: No focal deficit present.      Mental Status: She is alert and oriented to person, place, and time.   Psychiatric:         Mood and Affect: Mood normal.         Behavior: Behavior normal.                    Medical Decision Making:      Comorbidities that affect care:    Atrial Fibrillation, Diabetes, Hypertension, Obesity    External Notes reviewed:    Previous Clinic Note: Orthopedic surgery for knee pain      The following orders were placed and all results were independently analyzed by me:  Orders Placed This Encounter   Procedures    Apply ace wrap       Medications Given in the Emergency Department:  Medications   predniSONE (DELTASONE) tablet 40 mg (40 mg Oral Given 3/19/25 1821)   hydrOXYzine (ATARAX) tablet 50 mg (50 mg Oral Given 3/19/25 1821)        ED Course:         Labs:    Lab Results (last 24 hours)       ** No results found for the last 24 hours. **             Imaging:    No Radiology Exams Resulted Within Past 24 Hours      Differential Diagnosis and Discussion:    Rash: Differential diagnosis includes but is not limited to sepsis, cellulitis, [place] Spotted Fever, meningitis, meningococcemia, Varicella, Strep infection, dermatitis, allergic reaction, Lyme disease, and toxic shock syndrome.    PROCEDURES:        No orders to display       Procedures    MDM     Patient Care Considerations:    ANTIBIOTICS: I considered prescribing antibiotics as an outpatient however no bacterial focus of infection was found.      Consultants/Shared Management Plan:    None    Social Determinants of Health:    Patient is independent, reliable, and has access to care.       Disposition and Care  Coordination:    Discharged: The patient is suitable and stable for discharge with no need for consideration of admission.    I have explained the patient´s condition, diagnoses and treatment plan based on the information available to me at this time. I have answered questions and addressed any concerns. The patient has a good  understanding of the patient´s diagnosis, condition, and treatment plan as can be expected at this point. The vital signs have been stable. The patient´s condition is stable and appropriate for discharge from the emergency department.      The patient will pursue further outpatient evaluation with the primary care physician or other designated or consulting physician as outlined in the discharge instructions. They are agreeable to this plan of care and follow-up instructions have been explained in detail. The patient has received these instructions in written format and has expressed an understanding of the discharge instructions. The patient is aware that any significant change in condition or worsening of symptoms should prompt an immediate return to this or the closest emergency department or call to 911.  I have explained discharge medications and the need for follow up with the patient/caretakers. This was also printed in the discharge instructions. Patient was discharged with the following medications and follow up:      Medication List        New Prescriptions      predniSONE 20 MG tablet  Commonly known as: DELTASONE  Take 1 tablet by mouth 3 (Three) Times a Day With Meals for 3 days, THEN 1 tablet 2 (Two) Times a Day for 3 days, THEN 1 tablet Daily for 3 days.  Start taking on: March 19, 2025            Changed      * hydrOXYzine 25 MG tablet  Commonly known as: ATARAX  Take 2 tablets by mouth 3 (Three) Times a Day As Needed for Itching.  What changed: You were already taking a medication with the same name, and this prescription was added. Make sure you understand how and when to  take each.     * hydrOXYzine 25 MG tablet  Commonly known as: ATARAX  What changed: Another medication with the same name was added. Make sure you understand how and when to take each.     ondansetron ODT 4 MG disintegrating tablet  Commonly known as: ZOFRAN-ODT  Take 1 tablet by mouth Every 8 (Eight) Hours As Needed for Nausea or Vomiting.  What changed: how much to take           * This list has 2 medication(s) that are the same as other medications prescribed for you. Read the directions carefully, and ask your doctor or other care provider to review them with you.                   Where to Get Your Medications        These medications were sent to [id number] - [place], [place] - [address] AT [facility] - [phone]  - [phone]   [address] [place] [address]      Phone: [phone]   hydrOXYzine 25 MG tablet  predniSONE 20 MG tablet      Janki Clayton MD  [address] [facility] [address]  [facility] [id number] [phone]             Final diagnoses:   Rash   Lymphedema   Molluscum contagiosum        ED Disposition       ED Disposition   Discharge    Condition   Stable    Comment   --               This medical record created using voice recognition software.             Seaver, Alyce B, APRN  03/19/25 2129

## 2025-03-23 ENCOUNTER — TELEPHONE (OUTPATIENT)
Dept: PULMONOLOGY | Facility: CLINIC | Age: 55
End: 2025-03-23
Payer: MEDICARE

## 2025-04-06 ENCOUNTER — HOSPITAL ENCOUNTER (EMERGENCY)
Facility: HOSPITAL | Age: 55
Discharge: HOME | End: 2025-04-06
Attending: EMERGENCY MEDICINE | Admitting: EMERGENCY MEDICINE
Payer: MEDICARE

## 2025-04-06 VITALS
WEIGHT: 293 LBS | DIASTOLIC BLOOD PRESSURE: 88 MMHG | SYSTOLIC BLOOD PRESSURE: 134 MMHG | HEART RATE: 99 BPM | BODY MASS INDEX: 45.99 KG/M2 | HEIGHT: 67 IN | OXYGEN SATURATION: 96 % | RESPIRATION RATE: 19 BRPM | TEMPERATURE: 98.2 F

## 2025-04-06 DIAGNOSIS — R21 RASH: Primary | ICD-10-CM

## 2025-04-06 PROCEDURE — 99282 EMERGENCY DEPT VISIT SF MDM: CPT

## 2025-04-06 PROCEDURE — 25010000002 DEXAMETHASONE SODIUM PHOSPHATE 10 MG/ML SOLUTION

## 2025-04-06 PROCEDURE — 25010000002 DIPHENHYDRAMINE PER 50 MG

## 2025-04-06 PROCEDURE — 96372 THER/PROPH/DIAG INJ SC/IM: CPT

## 2025-04-06 RX ORDER — DIAPER,BRIEF,INFANT-TODD,DISP
1 EACH MISCELLANEOUS EVERY 6 HOURS PRN
Qty: 28.35 G | Refills: 0 | Status: SHIPPED | OUTPATIENT
Start: 2025-04-06

## 2025-04-06 RX ORDER — DIPHENHYDRAMINE HYDROCHLORIDE 50 MG/ML
25 INJECTION, SOLUTION INTRAMUSCULAR; INTRAVENOUS ONCE
Status: COMPLETED | OUTPATIENT
Start: 2025-04-06 | End: 2025-04-06

## 2025-04-06 RX ORDER — PREDNISONE 20 MG/1
20 TABLET ORAL DAILY
Qty: 5 TABLET | Refills: 0 | Status: SHIPPED | OUTPATIENT
Start: 2025-04-06 | End: 2025-04-11

## 2025-04-06 RX ORDER — SULFAMETHOXAZOLE AND TRIMETHOPRIM 800; 160 MG/1; MG/1
1 TABLET ORAL
COMMUNITY
Start: 2025-03-28 | End: 2025-04-07

## 2025-04-06 RX ORDER — DEXAMETHASONE SODIUM PHOSPHATE 10 MG/ML
10 INJECTION, SOLUTION INTRAMUSCULAR; INTRAVENOUS ONCE
Status: COMPLETED | OUTPATIENT
Start: 2025-04-06 | End: 2025-04-06

## 2025-04-06 RX ADMIN — DIPHENHYDRAMINE HYDROCHLORIDE 25 MG: 50 INJECTION, SOLUTION INTRAMUSCULAR; INTRAVENOUS at 17:06

## 2025-04-06 RX ADMIN — DEXAMETHASONE SODIUM PHOSPHATE 10 MG: 10 INJECTION INTRAMUSCULAR; INTRAVENOUS at 17:05

## 2025-04-06 NOTE — ED PROVIDER NOTES
Time: 4:22 PM EDT  Date of encounter:  4/6/2025  Independent Historian/Clinical History and Information was obtained by:   Patient    History is limited by: N/A    Chief Complaint: Rash      History of Present Illness:  Patient is a 54 y.o. year old female who presents to the emergency department for evaluation of rash to bilateral lower extremities.  Patient reports rash began today and has progressed to feet and ankles, lower extremities and onto trunk.  Patient reports she recently was treated for this rash and finished a course of steroids 2 days ago.  Patient reports rash resolved with steroids and he presented today.  Reports itching and burning pain in the worst part of the rash.  Patient denies any shortness of breath.  Patient reports she is allergic to red dye denies any gluten ingestion avoid use in her diet.  Denies any new detergents or soaps and reports she has been following her restricted diet to the best of her knowledge.      Patient Care Team  Primary Care Provider: Janki Clayton MD    Past Medical History:     Allergies   Allergen Reactions    Bee Venom Anaphylaxis    Fd&C Yellow #5 (Tartrazine) Anaphylaxis     Patient states that she tolerates Keflex and Diflucan well by taking over-the-counter Benadryl along with the medications.    Neeta Anaphylaxis and Other (See Comments)    Iodinated Contrast Media Anaphylaxis, Itching and Other (See Comments)    Iodine Anaphylaxis    Meloxicam Shortness Of Breath and Swelling    Red Dye #40 (Allura Red) Shortness Of Breath    Methocarbamol Nausea And Vomiting    Oxycodone-Acetaminophen Itching, Swelling and Other (See Comments)    Famotidine Other (See Comments)     Pt experienced burning in vein and redness of arm above IV site    Food Color Red Other (See Comments)    Gluten Meal Nausea Only    Shellfish Allergy Other (See Comments)    Sumatriptan Swelling    Tramadol Other (See Comments)    Tramadol Hcl Other (See Comments)     ULCERS IN MOUTH     Adhesive Tape Rash     Past Medical History:   Diagnosis Date    A-fib     Asthma     Diabetes mellitus     Hypertension     Lymphedema     Migraine     Murmur     Sleep apnea      Past Surgical History:   Procedure Laterality Date    CHOLECYSTECTOMY      FOOT SURGERY      HYSTERECTOMY      INCONTINENCE SURGERY       Family History   Problem Relation Age of Onset    Colon cancer Father        Home Medications:  Prior to Admission medications    Medication Sig Start Date End Date Taking? Authorizing Provider   sulfamethoxazole-trimethoprim (BACTRIM DS,SEPTRA DS) 800-160 MG per tablet Take 1 tablet by mouth. 3/28/25 4/7/25 Yes Ernestine Jackson MD   albuterol sulfate  (90 Base) MCG/ACT inhaler Inhale 2 puffs Every 4 (Four) Hours As Needed.    Ernestine Jackson MD   cetirizine (zyrTEC) 10 MG tablet Take 1 tablet by mouth Daily. 8/15/23 1/18/25  Ernestine Jackson MD   diclofenac (VOLTAREN) 75 MG EC tablet TAKE 1 TABLET BY MOUTH TWICE DAILY 12/16/24   Ben Herzog MD   fluconazole (DIFLUCAN) 150 MG tablet  2/28/25   Ernestine Jackson MD   furosemide (LASIX) 20 MG tablet Take 1 tablet by mouth 2 (Two) Times a Day. 10/31/24   Ernestine Jackson MD   HYDROcodone-acetaminophen (NORCO) 7.5-325 MG per tablet Take 1 tablet by mouth 4 (Four) Times a Day. 3/23/24   Ernestine Jackson MD   hydrOXYzine (ATARAX) 25 MG tablet Take 1-2 tablets by mouth Every 8 (Eight) Hours As Needed for Anxiety.    Ernestine Jackson MD   hydrOXYzine (ATARAX) 25 MG tablet Take 2 tablets by mouth 3 (Three) Times a Day As Needed for Itching. 3/19/25   Seaver, Alyce B, APRN   ipratropium-albuterol (DUO-NEB) 0.5-2.5 mg/3 ml nebulizer Take 3 mL by nebulization Every 6 (Six) Hours As Needed for Wheezing.    Ernestine Jackson MD   levothyroxine (SYNTHROID, LEVOTHROID) 50 MCG tablet Take 1 tablet by mouth Daily. 3/28/24   Ernestine Jackson MD   LORazepam (ATIVAN) 1 MG tablet Take 1 tablet by mouth 2 (Two) Times  a Day. 2/21/24   Ernestine Jackson MD   LORazepam (ATIVAN) 1 MG tablet Take 1 tablet by mouth 2 (Two) Times a Day As Needed for Anxiety (this is in addition to the BID scheduled dose. Max Daily Dose of 4 mg).    Ernestine Jackson MD   metroNIDAZOLE (FLAGYL) 500 MG tablet  2/14/25   Ernestine Jackson MD   montelukast (SINGULAIR) 10 MG tablet Take 1 tablet by mouth Daily. 2/4/25   Ernestine Jackson MD   nystatin (MYCOSTATIN) 649370 UNIT/GM cream Apply 1 Application topically to the appropriate area as directed 2 (Two) Times a Day As Needed.    Ernestine Jackson MD   nystatin (MYCOSTATIN) 949247 UNIT/GM powder Apply 1 Application topically to the appropriate area as directed 4 (Four) Times a Day As Needed (Skin folds).    Ernestine Jackson MD   ondansetron ODT (ZOFRAN-ODT) 4 MG disintegrating tablet Take 1 tablet by mouth Every 8 (Eight) Hours As Needed for Nausea or Vomiting.  Patient taking differently: Take 2 tablets by mouth Every 8 (Eight) Hours As Needed for Nausea or Vomiting. 7/7/24   Deann Yeh APRN   polyethylene glycol (MIRALAX) 17 GM/SCOOP powder Take 17 g by mouth Daily. 3/5/25   Carolyn Sanchez APRN   pravastatin (PRAVACHOL) 20 MG tablet Take 1 tablet by mouth Daily. 2/4/25   Ernestine Jackson MD   sodium-potassium-magnesium sulfates (Suprep Bowel Prep Kit) 17.5-3.13-1.6 GM/177ML solution oral solution Take 2 bottles by mouth Take As Directed. 3/5/25   Carolyn Sanchez APRN   traZODone (DESYREL) 50 MG tablet Take 1-2 tablets by mouth At Night As Needed for Sleep.    Ernestine Jackson MD        Social History:   Social History     Tobacco Use    Smoking status: Never    Smokeless tobacco: Never   Vaping Use    Vaping status: Never Used   Substance Use Topics    Alcohol use: Never    Drug use: Never         Review of Systems:  Review of Systems   Constitutional:  Negative for chills, fatigue and fever.   HENT:  Negative for ear pain, rhinorrhea and sore throat.   "  Eyes:  Negative for visual disturbance.   Respiratory:  Negative for cough and shortness of breath.    Cardiovascular:  Negative for chest pain.   Gastrointestinal:  Negative for abdominal pain, diarrhea and vomiting.   Genitourinary:  Negative for difficulty urinating.   Musculoskeletal:  Negative for arthralgias, back pain and myalgias.   Skin:  Negative for rash.   Neurological:  Negative for light-headedness and headaches.   Hematological:  Negative for adenopathy.   Psychiatric/Behavioral: Negative.          Physical Exam:  /88 (BP Location: Right arm, Patient Position: Sitting)   Pulse 99   Temp 98.2 °F (36.8 °C) (Oral)   Resp 19   Ht 170.2 cm (67\")   Wt (!) 165 kg (364 lb 3.2 oz)   SpO2 96%   BMI 57.04 kg/m²     Physical Exam  Vitals and nursing note reviewed.   Constitutional:       General: She is not in acute distress.     Appearance: Normal appearance. She is not toxic-appearing.   HENT:      Head: Normocephalic and atraumatic.      Nose: Nose normal.      Mouth/Throat:      Mouth: Mucous membranes are moist.   Eyes:      Conjunctiva/sclera: Conjunctivae normal.   Cardiovascular:      Rate and Rhythm: Normal rate.      Pulses: Normal pulses.   Pulmonary:      Effort: Pulmonary effort is normal.   Musculoskeletal:         General: Normal range of motion.      Cervical back: Normal range of motion.   Skin:     General: Skin is warm and dry.      Findings: Rash (Bright red maculopapular rash to the lower extremities more concentrated distally.) present.   Neurological:      General: No focal deficit present.      Mental Status: She is alert and oriented to person, place, and time.   Psychiatric:         Mood and Affect: Mood normal.         Behavior: Behavior normal.         Thought Content: Thought content normal.         Judgment: Judgment normal.                    Medical Decision Making:      Comorbidities that affect care:    Atrial Fibrillation, Diabetes, Hypertension    External Notes " reviewed:    None      The following orders were placed and all results were independently analyzed by me:  No orders of the defined types were placed in this encounter.      Medications Given in the Emergency Department:  Medications   diphenhydrAMINE (BENADRYL) injection 25 mg (has no administration in time range)   dexAMETHasone sodium phosphate injection 10 mg (has no administration in time range)        ED Course:         Labs:    Lab Results (last 24 hours)       ** No results found for the last 24 hours. **             Imaging:    No Radiology Exams Resulted Within Past 24 Hours      Differential Diagnosis and Discussion:    Rash: Differential diagnosis includes but is not limited to sepsis, cellulitis, [place] Spotted Fever, meningitis, meningococcemia, Varicella, Strep infection, dermatitis, allergic reaction, Lyme disease, and toxic shock syndrome.    PROCEDURES:        No orders to display       Procedures    MDM  Number of Diagnoses or Management Options  Rash  Diagnosis management comments: I have explained the patient´s condition, diagnoses and treatment plan based on the information available to me at this time. I have answered questions and addressed any concerns. The patient has a good  understanding of the patient´s diagnosis, condition, and treatment plan as can be expected at this point. The vital signs have been stable. The patient´s condition is stable and appropriate for discharge from the emergency department.      The patient will pursue further outpatient evaluation with the primary care physician or other designated or consulting physician as outlined in the discharge instructions. They are agreeable to this plan of care and follow-up instructions have been explained in detail. The patient has received these instructions in written format and has expressed an understanding of the discharge instructions. The patient is aware that any significant change in condition or worsening of  symptoms should prompt an immediate return to this or the closest emergency department or call to 911.                         Patient Care Considerations:    ANTIBIOTICS: I considered prescribing antibiotics as an outpatient however no bacterial focus of infection was found.      Consultants/Shared Management Plan:    None    Social Determinants of Health:    Patient is independent, reliable, and has access to care.       Disposition and Care Coordination:    Discharged: The patient is suitable and stable for discharge with no need for consideration of admission.    I have explained the patient´s condition, diagnoses and treatment plan based on the information available to me at this time. I have answered questions and addressed any concerns. The patient has a good  understanding of the patient´s diagnosis, condition, and treatment plan as can be expected at this point. The vital signs have been stable. The patient´s condition is stable and appropriate for discharge from the emergency department.      The patient will pursue further outpatient evaluation with the primary care physician or other designated or consulting physician as outlined in the discharge instructions. They are agreeable to this plan of care and follow-up instructions have been explained in detail. The patient has received these instructions in written format and has expressed an understanding of the discharge instructions. The patient is aware that any significant change in condition or worsening of symptoms should prompt an immediate return to this or the closest emergency department or call to 911.  I have explained discharge medications and the need for follow up with the patient/caretakers. This was also printed in the discharge instructions. Patient was discharged with the following medications and follow up:      Medication List        New Prescriptions      hydrocortisone 0.5 % cream  Apply 1 Application topically to the appropriate area  as directed Every 6 (Six) Hours As Needed for Rash.     predniSONE 20 MG tablet  Commonly known as: DELTASONE  Take 1 tablet by mouth Daily for 5 days.            Changed      ondansetron ODT 4 MG disintegrating tablet  Commonly known as: ZOFRAN-ODT  Take 1 tablet by mouth Every 8 (Eight) Hours As Needed for Nausea or Vomiting.  What changed: how much to take               Where to Get Your Medications        These medications were sent to [id number] - [place], [place] - [address] [facility] [address] - [phone]  - [phone] FX  [address] [place] [address] [facility] [address]      Phone: [phone]   hydrocortisone 0.5 % cream  predniSONE 20 MG tablet      Janki Clayton MD  [address]  [address]  [facility] [address]  [phone]    Go in 3 days      Naz Stout, PA-C  [address] [address]  [phone]    Schedule an appointment as soon as possible for a visit       Dian Villar MD  [address] [facility] [place] [address]  [phone]    Schedule an appointment as soon as possible for a visit       [facility] [place]  [address]  [facility] [id number] [phone]  Schedule an appointment as soon as possible for a visit          Final diagnoses:   Rash        ED Disposition       ED Disposition   Discharge    Condition   Stable    Comment   --               This medical record created using voice recognition software.             Kaitlin Guillen P, APRN  04/06/25 3251

## 2025-04-06 NOTE — DISCHARGE INSTRUCTIONS
Please help with your primary care provider in 2 to 3 days for reevaluation.  Some alternative numbers for dermatologist have been provided to you on this discharge packet to see if you can schedule your appointment for sooner.  You are prescribed steroids and a steroid cream to apply to the rash that you  at your pharmacy today and begin using.  Return to the ED for any new or worsening concerning symptoms.

## 2025-04-07 ENCOUNTER — TRANSCRIBE ORDERS (OUTPATIENT)
Facility: HOSPITAL | Age: 55
End: 2025-04-07
Payer: MEDICARE

## 2025-04-07 ENCOUNTER — LAB (OUTPATIENT)
Facility: HOSPITAL | Age: 55
End: 2025-04-07
Payer: MEDICARE

## 2025-04-07 ENCOUNTER — TELEPHONE (OUTPATIENT)
Dept: GASTROENTEROLOGY | Facility: CLINIC | Age: 55
End: 2025-04-07
Payer: MEDICARE

## 2025-04-07 DIAGNOSIS — D69.2: ICD-10-CM

## 2025-04-07 DIAGNOSIS — D69.2: Primary | ICD-10-CM

## 2025-04-07 DIAGNOSIS — G71.038 LIMB-GIRDLE MUSCULAR DYSTROPHY R21 ASSOCIATED WITH MUTATION IN POGLUT1 GENE: ICD-10-CM

## 2025-04-07 PROCEDURE — 82595 ASSAY OF CRYOGLOBULIN: CPT

## 2025-04-07 PROCEDURE — 36415 COLL VENOUS BLD VENIPUNCTURE: CPT

## 2025-04-07 NOTE — TELEPHONE ENCOUNTER
ENDO RECONCILIATION  Verify source of procedure(s): Office visit  If other, please list source: 3/5/258    TIME OUT-CONFIRM CORRECT PROCEDURE: EGD & Colonoscopy  Cardiology:  Pulmonology:  Blood thinner:   GLP-1:  Additional DX/indication for procedure:     Please include any other notes relevant to endo reconciliation:  N/A

## 2025-04-11 LAB — CRYOGLOB SER QL 1D COLD INC: NORMAL

## 2025-04-16 ENCOUNTER — TELEPHONE (OUTPATIENT)
Dept: GASTROENTEROLOGY | Facility: CLINIC | Age: 55
End: 2025-04-16
Payer: MEDICARE
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
## 2025-04-22 ENCOUNTER — HOSPITAL ENCOUNTER (EMERGENCY)
Facility: HOSPITAL | Age: 55
Discharge: HOME | End: 2025-04-23
Attending: EMERGENCY MEDICINE | Admitting: EMERGENCY MEDICINE
Payer: MEDICARE

## 2025-04-22 DIAGNOSIS — L95.9 VASCULITIS OF SKIN: Primary | ICD-10-CM

## 2025-04-22 LAB
ALBUMIN SERPL-MCNC: 3.7 G/DL (ref 3.5–5.2)
ALBUMIN/GLOB SERPL: 0.9 G/DL
ALP SERPL-CCNC: 118 U/L (ref 39–117)
ALT SERPL W P-5'-P-CCNC: 20 U/L (ref 1–33)
ANION GAP SERPL CALCULATED.3IONS-SCNC: 11.5 MMOL/L (ref 5–15)
AST SERPL-CCNC: 23 U/L (ref 1–32)
BASOPHILS # BLD AUTO: 0.04 10*3/MM3 (ref 0–0.2)
BASOPHILS NFR BLD AUTO: 0.4 % (ref 0–1.5)
BILIRUB SERPL-MCNC: 0.4 MG/DL (ref 0–1.2)
BUN SERPL-MCNC: 13 MG/DL (ref 6–20)
BUN/CREAT SERPL: 12.3 (ref 7–25)
CALCIUM SPEC-SCNC: 9.2 MG/DL (ref 8.6–10.5)
CHLORIDE SERPL-SCNC: 101 MMOL/L (ref 98–107)
CO2 SERPL-SCNC: 24.5 MMOL/L (ref 22–29)
CREAT SERPL-MCNC: 1.06 MG/DL (ref 0.57–1)
CRP SERPL-MCNC: 5.9 MG/DL (ref 0–0.5)
DEPRECATED RDW RBC AUTO: 48 FL (ref 37–54)
EGFRCR SERPLBLD CKD-EPI 2021: 63.3 ML/MIN/1.73
EOSINOPHIL # BLD AUTO: 0.11 10*3/MM3 (ref 0–0.4)
EOSINOPHIL NFR BLD AUTO: 1 % (ref 0.3–6.2)
ERYTHROCYTE [DISTWIDTH] IN BLOOD BY AUTOMATED COUNT: 14.8 % (ref 12.3–15.4)
ERYTHROCYTE [SEDIMENTATION RATE] IN BLOOD: 41 MM/HR (ref 0–30)
GLOBULIN UR ELPH-MCNC: 4.1 GM/DL
GLUCOSE SERPL-MCNC: 133 MG/DL (ref 65–99)
HCT VFR BLD AUTO: 40.7 % (ref 34–46.6)
HGB BLD-MCNC: 12.8 G/DL (ref 12–15.9)
HOLD SPECIMEN: NORMAL
IMM GRANULOCYTES # BLD AUTO: 0.05 10*3/MM3 (ref 0–0.05)
IMM GRANULOCYTES NFR BLD AUTO: 0.4 % (ref 0–0.5)
INR PPP: 1.02 (ref 0.86–1.15)
LYMPHOCYTES # BLD AUTO: 1.95 10*3/MM3 (ref 0.7–3.1)
LYMPHOCYTES NFR BLD AUTO: 17.5 % (ref 19.6–45.3)
MCH RBC QN AUTO: 27.8 PG (ref 26.6–33)
MCHC RBC AUTO-ENTMCNC: 31.4 G/DL (ref 31.5–35.7)
MCV RBC AUTO: 88.3 FL (ref 79–97)
MONOCYTES # BLD AUTO: 0.67 10*3/MM3 (ref 0.1–0.9)
MONOCYTES NFR BLD AUTO: 6 % (ref 5–12)
NEUTROPHILS NFR BLD AUTO: 74.7 % (ref 42.7–76)
NEUTROPHILS NFR BLD AUTO: 8.34 10*3/MM3 (ref 1.7–7)
NRBC BLD AUTO-RTO: 0 /100 WBC (ref 0–0.2)
PLATELET # BLD AUTO: 345 10*3/MM3 (ref 140–450)
PMV BLD AUTO: 10.4 FL (ref 6–12)
POTASSIUM SERPL-SCNC: 4.1 MMOL/L (ref 3.5–5.2)
PROT SERPL-MCNC: 7.8 G/DL (ref 6–8.5)
PROTHROMBIN TIME: 13.8 SECONDS (ref 11.8–14.9)
RBC # BLD AUTO: 4.61 10*6/MM3 (ref 3.77–5.28)
SODIUM SERPL-SCNC: 137 MMOL/L (ref 136–145)
WBC NRBC COR # BLD AUTO: 11.16 10*3/MM3 (ref 3.4–10.8)
WHOLE BLOOD HOLD COAG: NORMAL
WHOLE BLOOD HOLD SPECIMEN: NORMAL

## 2025-04-22 PROCEDURE — 99283 EMERGENCY DEPT VISIT LOW MDM: CPT

## 2025-04-22 PROCEDURE — 80053 COMPREHEN METABOLIC PANEL: CPT | Performed by: NURSE PRACTITIONER

## 2025-04-22 PROCEDURE — 85610 PROTHROMBIN TIME: CPT | Performed by: NURSE PRACTITIONER

## 2025-04-22 PROCEDURE — 25010000002 ONDANSETRON PER 1 MG: Performed by: NURSE PRACTITIONER

## 2025-04-22 PROCEDURE — 96375 TX/PRO/DX INJ NEW DRUG ADDON: CPT

## 2025-04-22 PROCEDURE — 86140 C-REACTIVE PROTEIN: CPT | Performed by: NURSE PRACTITIONER

## 2025-04-22 PROCEDURE — 25010000002 METHYLPREDNISOLONE PER 125 MG: Performed by: NURSE PRACTITIONER

## 2025-04-22 PROCEDURE — 96374 THER/PROPH/DIAG INJ IV PUSH: CPT

## 2025-04-22 PROCEDURE — 85652 RBC SED RATE AUTOMATED: CPT | Performed by: NURSE PRACTITIONER

## 2025-04-22 PROCEDURE — 85025 COMPLETE CBC W/AUTO DIFF WBC: CPT | Performed by: NURSE PRACTITIONER

## 2025-04-22 RX ORDER — ONDANSETRON 2 MG/ML
4 INJECTION INTRAMUSCULAR; INTRAVENOUS ONCE
Status: DISCONTINUED | OUTPATIENT
Start: 2025-04-22 | End: 2025-04-22

## 2025-04-22 RX ORDER — ONDANSETRON 2 MG/ML
4 INJECTION INTRAMUSCULAR; INTRAVENOUS
Status: COMPLETED | OUTPATIENT
Start: 2025-04-22 | End: 2025-04-22

## 2025-04-22 RX ORDER — PREDNISONE 10 MG
TABLET, DOSE PACK ORAL
Qty: 65 TABLET | Refills: 0 | Status: SHIPPED | OUTPATIENT
Start: 2025-04-22 | End: 2025-05-12

## 2025-04-22 RX ORDER — OXYCODONE AND ACETAMINOPHEN 10; 325 MG/1; MG/1
1 TABLET ORAL ONCE
Refills: 0 | Status: COMPLETED | OUTPATIENT
Start: 2025-04-22 | End: 2025-04-22

## 2025-04-22 RX ADMIN — METHYLPREDNISOLONE SODIUM SUCCINATE 125 MG: 125 INJECTION INTRAMUSCULAR; INTRAVENOUS at 23:24

## 2025-04-22 RX ADMIN — OXYCODONE AND ACETAMINOPHEN 1 TABLET: 10; 325 TABLET ORAL at 22:39

## 2025-04-22 RX ADMIN — ONDANSETRON 4 MG: 2 INJECTION INTRAMUSCULAR; INTRAVENOUS at 22:39

## 2025-04-22 RX ADMIN — ONDANSETRON 4 MG: 2 INJECTION INTRAMUSCULAR; INTRAVENOUS at 22:20

## 2025-04-23 VITALS
HEART RATE: 89 BPM | TEMPERATURE: 98.1 F | OXYGEN SATURATION: 99 % | SYSTOLIC BLOOD PRESSURE: 143 MMHG | WEIGHT: 293 LBS | DIASTOLIC BLOOD PRESSURE: 82 MMHG | HEIGHT: 67 IN | BODY MASS INDEX: 45.99 KG/M2 | RESPIRATION RATE: 18 BRPM

## 2025-04-23 NOTE — DISCHARGE INSTRUCTIONS
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  I am placing you back on a long-term taper of steroids.  You will have to watch your blood sugars very closely.  You need to follow-up with with Siria Clayton in 2 to 3 days and discussed with her the possibility of having to take possibly take insulin if your sugars remain high in order to get the steroid medication.  I also gave you several numbers for some dermatology offices.  One in [place] 1 in [place] and 1 here in [place] that you can call to see if how soon they can get you in for follow-up.  Continue take your pain medicine as prescribed.  Return to the emergency department immediately for any open wounds that are weeping, any chest pain or shortness of air, any fevers of 101 or greater or any new or worse concerns.
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
## 2025-04-23 NOTE — ED PROVIDER NOTES
Time: 10:31 PM EDT  Date of encounter:  4/22/2025  Independent Historian/Clinical History and Information was obtained by:   Patient    History is limited by: N/A    Chief Complaint: RASH      History of Present Illness:    The patient is a 54 y.o. year old female who presents to the emergency department for evaluation of rash.  The patient states that this started rather abruptly the afternoon of March 18.  The patient states that she has been seen in the emergency department a couple of times and treated for rash.  She states that she has followed up with her family doctor and was treated also for rash but also for cellulitis.  She was prescribed Bactrim and states that she completed it with no difference in her symptoms.  She presents here today stating that it just continues to get worse.  She has a very reddened lower extremities that do not charity and states that the area is of redness are starting to spread to her upper extremities in her hairline on the back of her neck.  She states that she has taken 21-day taper of steroids with no improvement in her symptoms.  She states that it is very painful and burns and itches.  She does not have any open wounds anywhere noted but does have several little blackened areas to the bilateral ankles she has no areas of redness to her mucous membranes in her mouth or to her palms or the soles of her feet.  She does seem to be developing some small areas of redness to the hairline on her neck states that it feels like it is burning just like everywhere else now.  There are no significant areas to the trunk front or back.  She denies any fevers.  She states that her diabetes is very well-controlled and is off her insulins but states that it did get up while she was on the steroids at 1 time.  She states she called dermatology and was told it was a 9-month wait for an appointment.  Her breath sounds are clear.  Her airway is patent.  She is neurovascularly  intact.      Patient Care Team  Primary Care Provider: Janki Clayton MD    Past Medical History:     Allergies   Allergen Reactions    Bee Venom Anaphylaxis    Fd&C Yellow #5 (Tartrazine) Anaphylaxis     Patient states that she tolerates Keflex and Diflucan well by taking over-the-counter Benadryl along with the medications.    Ginger Anaphylaxis and Other (See Comments)    Iodinated Contrast Media Anaphylaxis, Itching and Other (See Comments)    Iodine Anaphylaxis    Meloxicam Shortness Of Breath and Swelling    Red Dye #40 (Allura Red) Shortness Of Breath    Methocarbamol Nausea And Vomiting    Famotidine Other (See Comments)     Pt experienced burning in vein and redness of arm above IV site    Food Color Red Other (See Comments)    Gluten Meal Nausea Only    Shellfish Allergy Other (See Comments)    Sumatriptan Swelling    Tramadol Other (See Comments)    Tramadol Hcl Other (See Comments)     ULCERS IN MOUTH    Adhesive Tape Rash     Past Medical History:   Diagnosis Date    A-fib     Asthma     Diabetes mellitus     Hypertension     Lymphedema     Migraine     Murmur     Sleep apnea      Past Surgical History:   Procedure Laterality Date    CHOLECYSTECTOMY      FOOT SURGERY      HYSTERECTOMY      INCONTINENCE SURGERY       Family History   Problem Relation Age of Onset    Colon cancer Father        Home Medications:  Prior to Admission medications    Medication Sig Start Date End Date Taking? Authorizing Provider   albuterol sulfate  (90 Base) MCG/ACT inhaler Inhale 2 puffs Every 4 (Four) Hours As Needed.    ProviderErnestine MD   cetirizine (zyrTEC) 10 MG tablet Take 1 tablet by mouth Daily. 8/15/23 1/18/25  Ernestine Jackson MD   diclofenac (VOLTAREN) 75 MG EC tablet TAKE 1 TABLET BY MOUTH TWICE DAILY 12/16/24   Ben Herzog MD   fluconazole (DIFLUCAN) 150 MG tablet  2/28/25   Ernestnie Jackson MD   furosemide (LASIX) 20 MG tablet Take 1 tablet by mouth 2 (Two) Times a Day.  10/31/24   Ernestine Jackson MD   HYDROcodone-acetaminophen (NORCO) 7.5-325 MG per tablet Take 1 tablet by mouth 4 (Four) Times a Day. 3/23/24   Ernestine Jackson MD   hydrocortisone 0.5 % cream Apply 1 Application topically to the appropriate area as directed Every 6 (Six) Hours As Needed for Rash. 4/6/25   Kaitlin Guillen APRN   hydrOXYzine (ATARAX) 25 MG tablet Take 1-2 tablets by mouth Every 8 (Eight) Hours As Needed for Anxiety.    Ernestine Jackson MD   hydrOXYzine (ATARAX) 25 MG tablet Take 2 tablets by mouth 3 (Three) Times a Day As Needed for Itching. 3/19/25   Seaver, Alyce B, APRN   ipratropium-albuterol (DUO-NEB) 0.5-2.5 mg/3 ml nebulizer Take 3 mL by nebulization Every 6 (Six) Hours As Needed for Wheezing.    Ernestine Jackson MD   levothyroxine (SYNTHROID, LEVOTHROID) 50 MCG tablet Take 1 tablet by mouth Daily. 3/28/24   Ernestine Jackson MD   LORazepam (ATIVAN) 1 MG tablet Take 1 tablet by mouth 2 (Two) Times a Day. 2/21/24   Ernestine Jackson MD   LORazepam (ATIVAN) 1 MG tablet Take 1 tablet by mouth 2 (Two) Times a Day As Needed for Anxiety (this is in addition to the BID scheduled dose. Max Daily Dose of 4 mg).    Ernestine Jackson MD   metroNIDAZOLE (FLAGYL) 500 MG tablet  2/14/25   Ernestine Jackson MD   montelukast (SINGULAIR) 10 MG tablet Take 1 tablet by mouth Daily. 2/4/25   Ernestine Jackson MD   nystatin (MYCOSTATIN) 132181 UNIT/GM cream Apply 1 Application topically to the appropriate area as directed 2 (Two) Times a Day As Needed.    Ernestine Jackson MD   nystatin (MYCOSTATIN) 196766 UNIT/GM powder Apply 1 Application topically to the appropriate area as directed 4 (Four) Times a Day As Needed (Skin folds).    Ernestine Jackson MD   ondansetron ODT (ZOFRAN-ODT) 4 MG disintegrating tablet Take 1 tablet by mouth Every 8 (Eight) Hours As Needed for Nausea or Vomiting.  Patient taking differently: Take 2 tablets by mouth Every 8 (Eight) Hours As  "Needed for Nausea or Vomiting. 7/7/24   Deann Yeh APRN   polyethylene glycol (MIRALAX) 17 GM/SCOOP powder Take 17 g by mouth Daily. 3/5/25   Carolyn Sanchez APRN   pravastatin (PRAVACHOL) 20 MG tablet Take 1 tablet by mouth Daily. 2/4/25   ProviderErnestine MD   sodium-potassium-magnesium sulfates (Suprep Bowel Prep Kit) 17.5-3.13-1.6 GM/177ML solution oral solution Take 2 bottles by mouth Take As Directed. 3/5/25   Carolyn Sanchez APRN   traZODone (DESYREL) 50 MG tablet Take 1-2 tablets by mouth At Night As Needed for Sleep.    Provider, MD Ernestine        Social History:   Social History     Tobacco Use    Smoking status: Never    Smokeless tobacco: Never   Vaping Use    Vaping status: Never Used   Substance Use Topics    Alcohol use: Never    Drug use: Never         Review of Systems:  Review of Systems   Constitutional:  Negative for chills and fever.   HENT:  Negative for congestion, ear pain and sore throat.    Eyes:  Negative for pain.   Respiratory:  Negative for cough, chest tightness and shortness of breath.    Cardiovascular:  Negative for chest pain.   Gastrointestinal:  Positive for nausea. Negative for abdominal pain, diarrhea and vomiting.   Genitourinary:  Negative for flank pain and hematuria.   Musculoskeletal:  Positive for myalgias (Complains of burning). Negative for back pain, joint swelling, neck pain and neck stiffness.   Skin:  Positive for color change and rash (Vesicular rash). Negative for pallor and wound.   Neurological:  Negative for seizures and headaches.   All other systems reviewed and are negative.       Physical Exam:  /82 (BP Location: Left arm, Patient Position: Sitting)   Pulse 89   Temp 98.1 °F (36.7 °C) (Oral)   Resp 18   Ht 170.2 cm (67\")   Wt (!) 166 kg (366 lb 10 oz)   SpO2 99%   BMI 57.42 kg/m²     Physical Exam  Vitals and nursing note reviewed.   Constitutional:       General: She is not in acute distress.     Appearance: Normal " appearance. She is not ill-appearing or toxic-appearing.   HENT:      Head: Normocephalic and atraumatic.      Mouth/Throat:      Mouth: Mucous membranes are moist.      Pharynx: Oropharynx is clear. No posterior oropharyngeal erythema.   Eyes:      General: No scleral icterus.     Conjunctiva/sclera: Conjunctivae normal.      Pupils: Pupils are equal, round, and reactive to light.   Cardiovascular:      Rate and Rhythm: Normal rate and regular rhythm.      Pulses: Normal pulses.   Pulmonary:      Effort: Pulmonary effort is normal. No respiratory distress.      Breath sounds: Normal breath sounds. No wheezing.   Abdominal:      General: Abdomen is flat.      Palpations: Abdomen is soft.      Tenderness: There is no abdominal tenderness. There is no guarding or rebound.   Musculoskeletal:         General: Tenderness present. Normal range of motion.      Cervical back: Normal range of motion and neck supple.   Skin:     General: Skin is warm and dry.      Capillary Refill: Capillary refill takes less than 2 seconds.      Findings: Erythema and rash present. No bruising or lesion.   Neurological:      General: No focal deficit present.      Mental Status: She is alert and oriented to person, place, and time. Mental status is at baseline.   Psychiatric:         Mood and Affect: Mood normal.         Behavior: Behavior normal.        Medical Decision Making:      Comorbidities that affect care:    Kajal, sleep apnea, hypertension, murmur, A-fib, asthma, diabetes, lymphedema    External Notes reviewed:    Previous Clinic Note: Was seen in the primary care office in 4/7/2025 for rash      The following orders were placed and all results were independently analyzed by me:  Orders Placed This Encounter   Procedures    Foster Draw    Comprehensive Metabolic Panel    Protime-INR    Sedimentation Rate    C-reactive Protein    CBC Auto Differential    IP General Consult (Use specialty-specific consult if known)    CBC &  Differential    Green Top (Gel)    Lavender Top    Gold Top - SST    Light Blue Top    Extra Tubes    Gray Top       Medications Given in the Emergency Department:  Medications   ondansetron (ZOFRAN) injection 4 mg (4 mg Intravenous Given 4/22/25 2239)   oxyCODONE-acetaminophen (PERCOCET)  MG per tablet 1 tablet (1 tablet Oral Given 4/22/25 2239)   methylPREDNISolone sodium succinate (SOLU-Medrol) 125 mg in sterile water (preservative free) 2 mL (125 mg Intravenous Given 4/22/25 2324)        ED Course:    ED Course as of 04/23/25 0047   Tue Apr 22, 2025 2231 I did verify with the patient twice that she could take Percocet.  She states that she takes hydrocodone daily and that is not helping with her pain.  She states that she has taken oxycodone before without any issues and not sure why that is on her list but states that she is wanting to take it today due to her increased pain. [TC]   Wed Apr 23, 2025   0017 Dr. Short reviewed the patient's test results and saw the patient in person.  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  He recommended supportive care and a another long taper of steroids and to follow-up with dermatology.  The patient states that she had checked with dermatology here in a town and was told it would be 9 months.  I am giving her the number for [facility] in [place], and [facility] here in [place] for her to call tomorrow in order to see how soon they can get her in.  The patient verbalized understanding of follow-up and return instructions to the ED. [TC]      ED Course User Index  [TC] Yasmin Baird APRN       Labs:    Lab Results (last 24 hours)       Procedure Component Value Units Date/Time    CBC & Differential [[id number]]  (Abnormal) Collected: 04/22/25 2205    Specimen: Blood Updated: 04/22/25 2214    Narrative:      The following orders were created for panel order CBC & Differential.  Procedure                                Abnormality         Status                     ---------                               -----------         ------                     CBC Auto Differential[[id number]]        Abnormal            Final result                 Please view results for these tests on the individual orders.    Comprehensive Metabolic Panel [[id number]]  (Abnormal) Collected: 04/22/25 2205    Specimen: Blood Updated: 04/22/25 2232     Glucose 133 mg/dL      BUN 13 mg/dL      Creatinine 1.06 mg/dL      Sodium 137 mmol/L      Potassium 4.1 mmol/L      Chloride 101 mmol/L      CO2 24.5 mmol/L      Calcium 9.2 mg/dL      Total Protein 7.8 g/dL      Albumin 3.7 g/dL      ALT (SGPT) 20 U/L      AST (SGOT) 23 U/L      Alkaline Phosphatase 118 U/L      Total Bilirubin 0.4 mg/dL      Globulin 4.1 gm/dL      A/G Ratio 0.9 g/dL      BUN/Creatinine Ratio 12.3     Anion Gap 11.5 mmol/L      eGFR 63.3 mL/min/1.73     Narrative:      GFR Categories in Chronic Kidney Disease (CKD)      GFR Category          GFR (mL/min/1.73)    Interpretation  G1                     90 or greater         Normal or high (1)  G2                      60-89                Mild decrease (1)  G3a                   45-59                Mild to moderate decrease  G3b                   30-44                Moderate to severe decrease  G4                    15-29                Severe decrease  G5                    14 or less           Kidney failure          (1)In the absence of evidence of kidney disease, neither GFR category G1 or G2 fulfill the criteria for CKD.    eGFR calculation 2021 CKD-EPI creatinine equation, which does not include race as a factor    Protime-INR [[id number]]  (Normal) Collected: 04/22/25 2205    Specimen: Blood Updated: 04/22/25 2223     Protime 13.8 Seconds      INR 1.02    Narrative:      Suggested Therapeutic Ranges For Oral Anticoagulant Therapy:  Level of Therapy                      INR Target Range  Standard Dose                             2.0-3.0  High Dose                                2.5-3.5  Patients not receiving anticoagulant  Therapy Normal Range                     0.86-1.15    Sedimentation Rate [[id number]]  (Abnormal) Collected: 04/22/25 2205    Specimen: Blood Updated: 04/22/25 2221     Sed Rate 41 mm/hr     C-reactive Protein [[id number]]  (Abnormal) Collected: 04/22/25 2205    Specimen: Blood Updated: 04/22/25 2232     C-Reactive Protein 5.90 mg/dL     CBC Auto Differential [[id number]]  (Abnormal) Collected: 04/22/25 2205    Specimen: Blood Updated: 04/22/25 2214     WBC 11.16 10*3/mm3      RBC 4.61 10*6/mm3      Hemoglobin 12.8 g/dL      Hematocrit 40.7 %      MCV 88.3 fL      MCH 27.8 pg      MCHC 31.4 g/dL      RDW 14.8 %      RDW-SD 48.0 fl      MPV 10.4 fL      Platelets 345 10*3/mm3      Neutrophil % 74.7 %      Lymphocyte % 17.5 %      Monocyte % 6.0 %      Eosinophil % 1.0 %      Basophil % 0.4 %      Immature Grans % 0.4 %      Neutrophils, Absolute 8.34 10*3/mm3      Lymphocytes, Absolute 1.95 10*3/mm3      Monocytes, Absolute 0.67 10*3/mm3      Eosinophils, Absolute 0.11 10*3/mm3      Basophils, Absolute 0.04 10*3/mm3      Immature Grans, Absolute 0.05 10*3/mm3      nRBC 0.0 /100 WBC              Imaging:    No Radiology Exams Resulted Within Past 24 Hours      Differential Diagnosis and Discussion:    Myalgia: Differential diagnosis includes but is not limited to muscle overuse or strain, infections, inflammatory conditions, autoimmune diseases, medications, metabolic disorders, fibromyalgia, vascular disorders, neurological conditions, psychological factors, toxins, and muscle disorders.  Rash: Differential diagnosis includes but is not limited to sepsis, cellulitis, [place] Spotted Fever, meningitis, meningococcemia, Varicella, Strep infection, dermatitis, allergic reaction, Lyme disease, and toxic shock syndrome.    PROCEDURES:    Labs were collected in the emergency department and all labs were reviewed and  interpreted by me.    No orders to display       Procedures    MDM  Number of Diagnoses or Management Options  Vasculitis of skin: established and worsening     Amount and/or Complexity of Data Reviewed  Clinical lab tests: reviewed    Risk of Complications, Morbidity, and/or Mortality  Presenting problems: low  Diagnostic procedures: low  Management options: low    Patient Progress  Patient progress: stable       Patient Care Considerations:    ANTIBIOTICS: I considered prescribing antibiotics as an outpatient however no bacterial focus of infection was found.      Consultants/Shared Management Plan:    I have discussed the case with Dr. Short who states that the patient can be safely discharged with close follow up.    Social Determinants of Health:    Patient is independent, reliable, and has access to care.       Disposition and Care Coordination:    Discharged: The patient is suitable and stable for discharge with no need for consideration of admission.    I have explained the patient´s condition, diagnoses and treatment plan based on the information available to me at this time. I have answered questions and addressed any concerns. The patient has a good  understanding of the patient´s diagnosis, condition, and treatment plan as can be expected at this point. The vital signs have been stable. The patient´s condition is stable and appropriate for discharge from the emergency department.      The patient will pursue further outpatient evaluation with the primary care physician or other designated or consulting physician as outlined in the discharge instructions. They are agreeable to this plan of care and follow-up instructions have been explained in detail. The patient has received these instructions in written format and has expressed an understanding of the discharge instructions. The patient is aware that any significant change in condition or worsening of symptoms should prompt an immediate return to this or  the closest emergency department or call to 911.  I have explained discharge medications and the need for follow up with the patient/caretakers. This was also printed in the discharge instructions. Patient was discharged with the following medications and follow up:      Medication List        New Prescriptions      PredniSONE 10 MG tablet pack  Commonly known as: DELTASONE  Take 20 mg by mouth 3 (Three) Times a Day for 5 days, THEN 20 mg 2 (Two) Times a Day for 5 days, THEN 20 mg Daily for 5 days, THEN 10 mg Daily for 5 days.  Start taking on: April 22, 2025            Changed      ondansetron ODT 4 MG disintegrating tablet  Commonly known as: ZOFRAN-ODT  Take 1 tablet by mouth Every 8 (Eight) Hours As Needed for Nausea or Vomiting.  What changed: how much to take               Where to Get Your Medications        You can get these medications from any pharmacy    Bring a paper prescription for each of these medications  PredniSONE 10 MG tablet pack      Kimmie Moreno MD  [address] [facility] [address] [phone]    Call   FOR FOLLOW UP    [facility]  [phone]        [facility]  [place], [place]  [phone]        Janki Clayton MD  [address] [facility] [address]  [facility] [place] [address] [phone]    Call   FOR FOLLOW UP       Final diagnoses:   Vasculitis of skin        ED Disposition       ED Disposition   Discharge    Condition   Stable    Comment   --               This medical record created using voice recognition software.             Yasmin Baird, APRN  04/23/25 0048

## 2025-06-08 ENCOUNTER — HOSPITAL ENCOUNTER (EMERGENCY)
Facility: HOSPITAL | Age: 55
Discharge: HOME | End: 2025-06-08
Attending: EMERGENCY MEDICINE | Admitting: EMERGENCY MEDICINE
Payer: MEDICARE

## 2025-06-08 ENCOUNTER — APPOINTMENT (OUTPATIENT)
Dept: GENERAL RADIOLOGY | Facility: HOSPITAL | Age: 55
End: 2025-06-08
Payer: MEDICARE

## 2025-06-08 VITALS
SYSTOLIC BLOOD PRESSURE: 135 MMHG | HEART RATE: 90 BPM | RESPIRATION RATE: 19 BRPM | BODY MASS INDEX: 45.99 KG/M2 | TEMPERATURE: 98 F | HEIGHT: 67 IN | OXYGEN SATURATION: 99 % | WEIGHT: 293 LBS | DIASTOLIC BLOOD PRESSURE: 89 MMHG

## 2025-06-08 DIAGNOSIS — R07.9 CHEST PAIN, UNSPECIFIED TYPE: Primary | ICD-10-CM

## 2025-06-08 LAB
ALBUMIN SERPL-MCNC: 3.9 G/DL (ref 3.5–5.2)
ALBUMIN/GLOB SERPL: 1.3 G/DL
ALP SERPL-CCNC: 104 U/L (ref 39–117)
ALT SERPL W P-5'-P-CCNC: 34 U/L (ref 1–33)
ANION GAP SERPL CALCULATED.3IONS-SCNC: 11.3 MMOL/L (ref 5–15)
AST SERPL-CCNC: 40 U/L (ref 1–32)
BASOPHILS # BLD AUTO: 0.07 10*3/MM3 (ref 0–0.2)
BASOPHILS NFR BLD AUTO: 0.9 % (ref 0–1.5)
BILIRUB SERPL-MCNC: 0.6 MG/DL (ref 0–1.2)
BUN SERPL-MCNC: 7.6 MG/DL (ref 6–20)
BUN/CREAT SERPL: 7.4 (ref 7–25)
CALCIUM SPEC-SCNC: 9.6 MG/DL (ref 8.6–10.5)
CHLORIDE SERPL-SCNC: 103 MMOL/L (ref 98–107)
CO2 SERPL-SCNC: 25.7 MMOL/L (ref 22–29)
CREAT SERPL-MCNC: 1.03 MG/DL (ref 0.57–1)
DEPRECATED RDW RBC AUTO: 45.1 FL (ref 37–54)
EGFRCR SERPLBLD CKD-EPI 2021: 65.6 ML/MIN/1.73
EOSINOPHIL # BLD AUTO: 0.13 10*3/MM3 (ref 0–0.4)
EOSINOPHIL NFR BLD AUTO: 1.6 % (ref 0.3–6.2)
ERYTHROCYTE [DISTWIDTH] IN BLOOD BY AUTOMATED COUNT: 14.1 % (ref 12.3–15.4)
GEN 5 1HR TROPONIN T REFLEX: 12 NG/L
GLOBULIN UR ELPH-MCNC: 3.1 GM/DL
GLUCOSE SERPL-MCNC: 107 MG/DL (ref 65–99)
HCT VFR BLD AUTO: 39.5 % (ref 34–46.6)
HGB BLD-MCNC: 12.4 G/DL (ref 12–15.9)
HOLD SPECIMEN: NORMAL
HOLD SPECIMEN: NORMAL
IMM GRANULOCYTES # BLD AUTO: 0.02 10*3/MM3 (ref 0–0.05)
IMM GRANULOCYTES NFR BLD AUTO: 0.2 % (ref 0–0.5)
LIPASE SERPL-CCNC: 39 U/L (ref 13–60)
LYMPHOCYTES # BLD AUTO: 2.12 10*3/MM3 (ref 0.7–3.1)
LYMPHOCYTES NFR BLD AUTO: 26 % (ref 19.6–45.3)
MAGNESIUM SERPL-MCNC: 1.8 MG/DL (ref 1.6–2.6)
MCH RBC QN AUTO: 27.6 PG (ref 26.6–33)
MCHC RBC AUTO-ENTMCNC: 31.4 G/DL (ref 31.5–35.7)
MCV RBC AUTO: 88 FL (ref 79–97)
MONOCYTES # BLD AUTO: 0.58 10*3/MM3 (ref 0.1–0.9)
MONOCYTES NFR BLD AUTO: 7.1 % (ref 5–12)
NEUTROPHILS NFR BLD AUTO: 5.24 10*3/MM3 (ref 1.7–7)
NEUTROPHILS NFR BLD AUTO: 64.2 % (ref 42.7–76)
NRBC BLD AUTO-RTO: 0 /100 WBC (ref 0–0.2)
NT-PROBNP SERPL-MCNC: 257 PG/ML (ref 0–900)
PLATELET # BLD AUTO: 333 10*3/MM3 (ref 140–450)
PMV BLD AUTO: 10.8 FL (ref 6–12)
POTASSIUM SERPL-SCNC: 4 MMOL/L (ref 3.5–5.2)
PROT SERPL-MCNC: 7 G/DL (ref 6–8.5)
RBC # BLD AUTO: 4.49 10*6/MM3 (ref 3.77–5.28)
SODIUM SERPL-SCNC: 140 MMOL/L (ref 136–145)
TROPONIN T NUMERIC DELTA: 1 NG/L
TROPONIN T SERPL HS-MCNC: 11 NG/L
WBC NRBC COR # BLD AUTO: 8.16 10*3/MM3 (ref 3.4–10.8)
WHOLE BLOOD HOLD COAG: NORMAL
WHOLE BLOOD HOLD SPECIMEN: NORMAL

## 2025-06-08 PROCEDURE — 84484 ASSAY OF TROPONIN QUANT: CPT | Performed by: EMERGENCY MEDICINE

## 2025-06-08 PROCEDURE — 71045 X-RAY EXAM CHEST 1 VIEW: CPT

## 2025-06-08 PROCEDURE — 83690 ASSAY OF LIPASE: CPT | Performed by: EMERGENCY MEDICINE

## 2025-06-08 PROCEDURE — 96374 THER/PROPH/DIAG INJ IV PUSH: CPT

## 2025-06-08 PROCEDURE — 83880 ASSAY OF NATRIURETIC PEPTIDE: CPT | Performed by: EMERGENCY MEDICINE

## 2025-06-08 PROCEDURE — 93005 ELECTROCARDIOGRAM TRACING: CPT | Performed by: EMERGENCY MEDICINE

## 2025-06-08 PROCEDURE — 83735 ASSAY OF MAGNESIUM: CPT | Performed by: EMERGENCY MEDICINE

## 2025-06-08 PROCEDURE — 99284 EMERGENCY DEPT VISIT MOD MDM: CPT

## 2025-06-08 PROCEDURE — 93005 ELECTROCARDIOGRAM TRACING: CPT

## 2025-06-08 PROCEDURE — 25010000002 ACETAMINOPHEN 10 MG/ML SOLUTION: Performed by: EMERGENCY MEDICINE

## 2025-06-08 PROCEDURE — 80053 COMPREHEN METABOLIC PANEL: CPT | Performed by: EMERGENCY MEDICINE

## 2025-06-08 PROCEDURE — 85025 COMPLETE CBC W/AUTO DIFF WBC: CPT | Performed by: EMERGENCY MEDICINE

## 2025-06-08 PROCEDURE — 36415 COLL VENOUS BLD VENIPUNCTURE: CPT

## 2025-06-08 RX ORDER — ASPIRIN 81 MG/1
324 TABLET, CHEWABLE ORAL ONCE
Status: COMPLETED | OUTPATIENT
Start: 2025-06-08 | End: 2025-06-08

## 2025-06-08 RX ORDER — ACETAMINOPHEN 10 MG/ML
1000 INJECTION, SOLUTION INTRAVENOUS ONCE
Status: COMPLETED | OUTPATIENT
Start: 2025-06-08 | End: 2025-06-08

## 2025-06-08 RX ORDER — SODIUM CHLORIDE 0.9 % (FLUSH) 0.9 %
10 SYRINGE (ML) INJECTION AS NEEDED
Status: DISCONTINUED | OUTPATIENT
Start: 2025-06-08 | End: 2025-06-09 | Stop reason: HOSPADM

## 2025-06-08 RX ADMIN — ASPIRIN 324 MG: 81 TABLET, CHEWABLE ORAL at 20:37

## 2025-06-08 RX ADMIN — ACETAMINOPHEN 1000 MG: 10 INJECTION INTRAVENOUS at 22:04

## 2025-06-09 LAB
QT INTERVAL: 411 MS
QTC INTERVAL: 462 MS

## 2025-06-09 NOTE — ED PROVIDER NOTES
Time: 9:41 PM EDT  Date of encounter:  6/8/2025  Independent Historian/Clinical History and Information was obtained by:   Patient    History is limited by: N/A    Chief Complaint: chest pain      History of Present Illness:  Patient is a 54 y.o. year old female who presents to the emergency department for evaluation of Chest pain.  Chest pain started last night around 11 PM.  Pain has been constant.  She does report some radiation to her jaw.  No shortness of breath.  No nausea vomiting.  No abdominal pain.  No fever or chills.  No cough or congestion.      Patient Care Team  Primary Care Provider: Janki Clayton MD    Past Medical History:     Allergies   Allergen Reactions    Bee Venom Anaphylaxis    Fd&C Yellow #5 (Tartrazine) Anaphylaxis     Patient states that she tolerates Keflex and Diflucan well by taking over-the-counter Benadryl along with the medications.    Neeta Anaphylaxis and Other (See Comments)    Iodinated Contrast Media Anaphylaxis, Itching and Other (See Comments)    Iodine Anaphylaxis    Meloxicam Shortness Of Breath and Swelling    Red Dye #40 (Allura Red) Shortness Of Breath    Methocarbamol Nausea And Vomiting    Famotidine Other (See Comments)     Pt experienced burning in vein and redness of arm above IV site    Food Color Red Other (See Comments)    Gluten Meal Nausea Only    Shellfish Allergy Other (See Comments)    Sumatriptan Swelling    Tramadol Other (See Comments)    Tramadol Hcl Other (See Comments)     ULCERS IN MOUTH    Adhesive Tape Rash     Past Medical History:   Diagnosis Date    A-fib     Asthma     Diabetes mellitus     Hypertension     Lymphedema     Migraine     Murmur     Sleep apnea      Past Surgical History:   Procedure Laterality Date    CHOLECYSTECTOMY      FOOT SURGERY      HYSTERECTOMY      INCONTINENCE SURGERY       Family History   Problem Relation Age of Onset    Colon cancer Father        Home Medications:  Prior to Admission medications    Medication Sig  Start Date End Date Taking? Authorizing Provider   albuterol sulfate  (90 Base) MCG/ACT inhaler Inhale 2 puffs Every 4 (Four) Hours As Needed.    Ernestine Jackson MD   cetirizine (zyrTEC) 10 MG tablet Take 1 tablet by mouth Daily. 8/15/23 1/18/25  Ernestine Jackson MD   diclofenac (VOLTAREN) 75 MG EC tablet TAKE 1 TABLET BY MOUTH TWICE DAILY 12/16/24   Ben Herzog MD   fluconazole (DIFLUCAN) 150 MG tablet  2/28/25   Ernestine Jackson MD   furosemide (LASIX) 20 MG tablet Take 1 tablet by mouth 2 (Two) Times a Day. 10/31/24   Ernestine Jackson MD   HYDROcodone-acetaminophen (NORCO) 7.5-325 MG per tablet Take 1 tablet by mouth 4 (Four) Times a Day. 3/23/24   Ernestine Jackson MD   hydrocortisone 0.5 % cream Apply 1 Application topically to the appropriate area as directed Every 6 (Six) Hours As Needed for Rash. 4/6/25   Kaitlin Guillen APRN   hydrOXYzine (ATARAX) 25 MG tablet Take 1-2 tablets by mouth Every 8 (Eight) Hours As Needed for Anxiety.    Ernestine Jackson MD   hydrOXYzine (ATARAX) 25 MG tablet Take 2 tablets by mouth 3 (Three) Times a Day As Needed for Itching. 3/19/25   Seaver, Alyce B, APRN   ipratropium-albuterol (DUO-NEB) 0.5-2.5 mg/3 ml nebulizer Take 3 mL by nebulization Every 6 (Six) Hours As Needed for Wheezing.    Ernestine Jackson MD   levothyroxine (SYNTHROID, LEVOTHROID) 50 MCG tablet Take 1 tablet by mouth Daily. 3/28/24   Ernestine Jackson MD   LORazepam (ATIVAN) 1 MG tablet Take 1 tablet by mouth 2 (Two) Times a Day. 2/21/24   Ernestine Jackson MD   LORazepam (ATIVAN) 1 MG tablet Take 1 tablet by mouth 2 (Two) Times a Day As Needed for Anxiety (this is in addition to the BID scheduled dose. Max Daily Dose of 4 mg).    Ernestine Jackson MD   metroNIDAZOLE (FLAGYL) 500 MG tablet  2/14/25   Ernestine Jackson MD   montelukast (SINGULAIR) 10 MG tablet Take 1 tablet by mouth Daily. 2/4/25   Provider, Historical, MD   nystatin  (MYCOSTATIN) 730642 UNIT/GM cream Apply 1 Application topically to the appropriate area as directed 2 (Two) Times a Day As Needed.    Ernestine Jackson MD   nystatin (MYCOSTATIN) 902445 UNIT/GM powder Apply 1 Application topically to the appropriate area as directed 4 (Four) Times a Day As Needed (Skin folds).    Ernestien Jackson MD   ondansetron ODT (ZOFRAN-ODT) 4 MG disintegrating tablet Take 1 tablet by mouth Every 8 (Eight) Hours As Needed for Nausea or Vomiting.  Patient taking differently: Take 2 tablets by mouth Every 8 (Eight) Hours As Needed for Nausea or Vomiting. 7/7/24   Deann Yeh APRN   polyethylene glycol (MIRALAX) 17 GM/SCOOP powder Take 17 g by mouth Daily. 3/5/25   Carolyn Sanchez APRN   pravastatin (PRAVACHOL) 20 MG tablet Take 1 tablet by mouth Daily. 2/4/25   Ernestine Jackson MD   sodium-potassium-magnesium sulfates (Suprep Bowel Prep Kit) 17.5-3.13-1.6 GM/177ML solution oral solution Take 2 bottles by mouth Take As Directed. 3/5/25   Carolyn Sanchez APRN   traZODone (DESYREL) 50 MG tablet Take 1-2 tablets by mouth At Night As Needed for Sleep.    Ernestine Jackson MD        Social History:   Social History     Tobacco Use    Smoking status: Never    Smokeless tobacco: Never   Vaping Use    Vaping status: Never Used   Substance Use Topics    Alcohol use: Never    Drug use: Never         Review of Systems:  Review of Systems   Constitutional:  Negative for chills and fever.   HENT:  Negative for congestion, ear pain and sore throat.    Eyes:  Negative for pain.   Respiratory:  Negative for cough, chest tightness and shortness of breath.    Cardiovascular:  Positive for chest pain.   Gastrointestinal:  Negative for abdominal pain, diarrhea, nausea and vomiting.   Genitourinary:  Negative for flank pain and hematuria.   Musculoskeletal:  Negative for joint swelling.   Skin:  Negative for pallor.   Neurological:  Negative for seizures and headaches.   All other  "systems reviewed and are negative.       Physical Exam:  /89 (BP Location: Right arm, Patient Position: Lying)   Pulse 90   Temp 98 °F (36.7 °C) (Oral)   Resp 19   Ht 170.2 cm (67\")   Wt (!) 166 kg (365 lb)   SpO2 99%   BMI 57.17 kg/m²     Physical Exam  Constitutional:       Appearance: Normal appearance. She is obese.   HENT:      Head: Normocephalic and atraumatic.      Nose: Nose normal.      Mouth/Throat:      Mouth: Mucous membranes are moist.   Eyes:      Extraocular Movements: Extraocular movements intact.      Conjunctiva/sclera: Conjunctivae normal.      Pupils: Pupils are equal, round, and reactive to light.   Cardiovascular:      Rate and Rhythm: Normal rate and regular rhythm.      Pulses: Normal pulses.      Heart sounds: Normal heart sounds.   Pulmonary:      Effort: Pulmonary effort is normal.      Breath sounds: Normal breath sounds.   Abdominal:      General: There is no distension.      Palpations: Abdomen is soft.      Tenderness: There is no abdominal tenderness.   Musculoskeletal:         General: Normal range of motion.      Cervical back: Normal range of motion.   Skin:     General: Skin is warm and dry.      Capillary Refill: Capillary refill takes less than 2 seconds.   Neurological:      General: No focal deficit present.      Mental Status: She is alert and oriented to person, place, and time. Mental status is at baseline.   Psychiatric:         Mood and Affect: Mood normal.         Behavior: Behavior normal.                    Medical Decision Making:      Comorbidities that affect care:    Asthma, Atrial Fibrillation, COPD, Diabetes, Hypertension    External Notes reviewed:    Previous Clinic Note: Patient was seen by orthopedic surgery on 3/7/2025 for right knee pain, left knee pain, bilateral primary osteoarthritis of knee.      The following orders were placed and all results were independently analyzed by me:  Orders Placed This Encounter   Procedures    XR Chest 1 View "    [place] Draw    High Sensitivity Troponin T    Comprehensive Metabolic Panel    Lipase    BNP    Magnesium    CBC Auto Differential    High Sensitivity Troponin T 1Hr    Ambulatory Referral to Cardiology for Chest Pain    Undress & Gown    Continuous Pulse Oximetry    ECG 12 Lead ED Triage Standing Order; Chest Pain    CBC & Differential    Green Top (Gel)    Lavender Top    Gold Top - SST    Light Blue Top       Medications Given in the Emergency Department:  Medications   aspirin chewable tablet 324 mg (324 mg Oral Given 6/8/25 2037)   acetaminophen (OFIRMEV) injection 1,000 mg (1,000 mg Intravenous Given 6/8/25 2204)        ED Course:    ED Course as of 06/09/25 0327   Sun Jun 08, 2025 2125 ECG 12 Lead ED Triage Standing Order; Chest Pain  Sinus rhythm with a rate of 76.  No acute ST elevation.  Normal AR and QTc.  EKG interpreted by me. [LD]      ED Course User Index  [LD] Nely Seth MD       Labs:    Lab Results (last 24 hours)       Procedure Component Value Units Date/Time    High Sensitivity Troponin T [[id number]]  (Normal) Collected: 06/08/25 2023    Specimen: Blood Updated: 06/08/25 2109     HS Troponin T 11 ng/L     Narrative:      High Sensitive Troponin T Reference Range:  <14.0 ng/L- Negative Female for AMI  <22.0 ng/L- Negative Male for AMI  >=14 - Abnormal Female indicating possible myocardial injury.  >=22 - Abnormal Male indicating possible myocardial injury.   Clinicians would have to utilize clinical acumen, EKG, Troponin, and serial changes to determine if it is an Acute Myocardial Infarction or myocardial injury due to an underlying chronic condition.         CBC & Differential [[id number]]  (Abnormal) Collected: 06/08/25 2023    Specimen: Blood Updated: 06/08/25 2035    Narrative:      The following orders were created for panel order CBC & Differential.  Procedure                               Abnormality         Status                     ---------                                -----------         ------                     CBC Auto Differential[[id number]]        Abnormal            Final result                 Please view results for these tests on the individual orders.    Comprehensive Metabolic Panel [[id number]]  (Abnormal) Collected: 06/08/25 2023    Specimen: Blood Updated: 06/08/25 2109     Glucose 107 mg/dL      BUN 7.6 mg/dL      Creatinine 1.03 mg/dL      Sodium 140 mmol/L      Potassium 4.0 mmol/L      Chloride 103 mmol/L      CO2 25.7 mmol/L      Calcium 9.6 mg/dL      Total Protein 7.0 g/dL      Albumin 3.9 g/dL      ALT (SGPT) 34 U/L      AST (SGOT) 40 U/L      Alkaline Phosphatase 104 U/L      Total Bilirubin 0.6 mg/dL      Globulin 3.1 gm/dL      A/G Ratio 1.3 g/dL      BUN/Creatinine Ratio 7.4     Anion Gap 11.3 mmol/L      eGFR 65.6 mL/min/1.73     Narrative:      GFR Categories in Chronic Kidney Disease (CKD)              GFR Category          GFR (mL/min/1.73)    Interpretation  G1                    90 or greater        Normal or high (1)  G2                    60-89                Mild decrease (1)  G3a                   45-59                Mild to moderate decrease  G3b                   30-44                Moderate to severe decrease  G4                    15-29                Severe decrease  G5                    14 or less           Kidney failure    (1)In the absence of evidence of kidney disease, neither GFR category G1 or G2 fulfill the criteria for CKD.    eGFR calculation 2021 CKD-EPI creatinine equation, which does not include race as a factor    Lipase [[id number]]  (Normal) Collected: 06/08/25 2023    Specimen: Blood Updated: 06/08/25 2109     Lipase 39 U/L     BNP [[id number]]  (Normal) Collected: 06/08/25 2023    Specimen: Blood Updated: 06/08/25 2106     proBNP 257.0 pg/mL     Narrative:      This assay is used as an aid in the diagnosis of individuals suspected of having heart failure. It can be used as an aid in the diagnosis of acute decompensated  heart failure (ADHF) in patients presenting with signs and symptoms of ADHF to the emergency department (ED). In addition, NT-proBNP of <300 pg/mL indicates ADHF is not likely.    Age Range Result Interpretation  NT-proBNP Concentration (pg/mL:      <50             Positive            >450                   Gray                 300-450                    Negative             <300    50-75           Positive            >900                  Gray                300-900                  Negative            <300      >75             Positive            >1800                  Gray                300-1800                  Negative            <300    Magnesium [[id number]]  (Normal) Collected: 06/08/25 2023    Specimen: Blood Updated: 06/08/25 2109     Magnesium 1.8 mg/dL     CBC Auto Differential [[id number]]  (Abnormal) Collected: 06/08/25 2023    Specimen: Blood Updated: 06/08/25 2035     WBC 8.16 10*3/mm3      RBC 4.49 10*6/mm3      Hemoglobin 12.4 g/dL      Hematocrit 39.5 %      MCV 88.0 fL      MCH 27.6 pg      MCHC 31.4 g/dL      RDW 14.1 %      RDW-SD 45.1 fl      MPV 10.8 fL      Platelets 333 10*3/mm3      Neutrophil % 64.2 %      Lymphocyte % 26.0 %      Monocyte % 7.1 %      Eosinophil % 1.6 %      Basophil % 0.9 %      Immature Grans % 0.2 %      Neutrophils, Absolute 5.24 10*3/mm3      Lymphocytes, Absolute 2.12 10*3/mm3      Monocytes, Absolute 0.58 10*3/mm3      Eosinophils, Absolute 0.13 10*3/mm3      Basophils, Absolute 0.07 10*3/mm3      Immature Grans, Absolute 0.02 10*3/mm3      nRBC 0.0 /100 WBC     High Sensitivity Troponin T 1Hr [[id number]]  (Normal) Collected: 06/08/25 2204    Specimen: Blood Updated: 06/08/25 2234     HS Troponin T 12 ng/L      Troponin T Numeric Delta 1 ng/L     Narrative:      High Sensitive Troponin T Reference Range:  <14.0 ng/L- Negative Female for AMI  <22.0 ng/L- Negative Male for AMI  >=14 - Abnormal Female indicating possible myocardial injury.  >=22 - Abnormal Male  indicating possible myocardial injury.   Clinicians would have to utilize clinical acumen, EKG, Troponin, and serial changes to determine if it is an Acute Myocardial Infarction or myocardial injury due to an underlying chronic condition.                  Imaging:    XR Chest 1 View  Result Date: 6/8/2025  XR CHEST 1 VW Date of Exam: 6/8/2025 8:51 PM EDT Indication: Chest Pain Triage Protocol Comparison: 12/3/2024 Findings: Heart is mildly enlarged. Pulmonary vascularity is normal. There is mild elevation of the right hemidiaphragm with some mild right basilar atelectasis. Lungs are otherwise clear. No pneumothorax is identified.     Mild cardiomegaly. Mild right basilar atelectasis. Electronically Signed: Ayo Parish MD  6/8/2025 9:11 PM EDT  Workstation ID: [id number]        Differential Diagnosis and Discussion:    Chest Pain:  Based on the patient's signs and symptoms, I considered aortic dissection, myocardial infaction, pulmonary embolism, cardiac tamponade, pericarditis, pneumothorax, musculoskeletal chest pain and other differential diagnosis as an etiology of the patient's chest pain.     PROCEDURES:    Labs were collected in the emergency department and all labs were reviewed and interpreted by me.  X-ray were performed in the emergency department and all X-ray impressions were independently interpreted by me.  An EKG was performed and the EKG was interpreted by me.    ECG 12 Lead ED Triage Standing Order; Chest Pain   Preliminary Result   HEART RATE=76  bpm   RR Bipacduk=685  ms   SC Bhafzgst=269  ms   P Horizontal Axis=-7  deg   P Front Axis=57  deg   QRSD Interval=90  ms   QT Egwdapmm=011  ms   JRwM=313  ms   QRS Axis=15  deg   T Wave Axis=17  deg   - BORDERLINE ECG -   Sinus rhythm   Probable left atrial enlargement   Date and Time of Study:2025-06-08 20:13:50          Procedures    MDM  Number of Diagnoses or Management Options  Chest pain, unspecified type  Diagnosis management comments: Patient  presented to the emergency department chest pain.  EKG shows sinus rhythm no acute ST elevation.  2 sets of troponins were both negative.  Labs show no significant abnormality.  Chest x-ray showed no acute finding.  Recommend close follow-up with her primary care provider.  Will refer patient to cardiology.  Discussed return precautions, discharge instructions and answered all her questions.       Amount and/or Complexity of Data Reviewed  Clinical lab tests: reviewed  Tests in the radiology section of CPT®: reviewed  Review and summarize past medical records: yes  Independent visualization of images, tracings, or specimens: yes    Risk of Complications, Morbidity, and/or Mortality  Presenting problems: moderate  Management options: moderate                       Patient Care Considerations:    CT CHEST: I considered ordering a CT scan of the chest, however no shortness of breath, no tachycardia, no hypoxia O2 sat 99% on room air      Consultants/Shared Management Plan:    None    Social Determinants of Health:    Patient is independent, reliable, and has access to care.       Disposition and Care Coordination:    Discharged: The patient is suitable and stable for discharge with no need for consideration of admission.    I have explained the patient´s condition, diagnoses and treatment plan based on the information available to me at this time. I have answered questions and addressed any concerns. The patient has a good  understanding of the patient´s diagnosis, condition, and treatment plan as can be expected at this point. The vital signs have been stable. The patient´s condition is stable and appropriate for discharge from the emergency department.      The patient will pursue further outpatient evaluation with the primary care physician or other designated or consulting physician as outlined in the discharge instructions. They are agreeable to this plan of care and follow-up instructions have been explained in  detail. The patient has received these instructions in written format and has expressed an understanding of the discharge instructions. The patient is aware that any significant change in condition or worsening of symptoms should prompt an immediate return to this or the closest emergency department or call to 911.  I have explained discharge medications and the need for follow up with the patient/caretakers. This was also printed in the discharge instructions. Patient was discharged with the following medications and follow up:      Medication List        Changed      ondansetron ODT 4 MG disintegrating tablet  Commonly known as: ZOFRAN-ODT  Take 1 tablet by mouth Every 8 (Eight) Hours As Needed for Nausea or Vomiting.  What changed: how much to take           Panta, Mimi Marie MD  [address]  [address]  [facility] [id number] [phone]    Schedule an appointment as soon as possible for a visit       Janki Clayton MD  [address]  [address]  [facility] [address]  [phone]    In 2 days         Final diagnoses:   Chest pain, unspecified type        ED Disposition       ED Disposition   Discharge    Condition   Stable    Comment   --               This medical record created using voice recognition software.             Nely Seth MD  06/09/25 2760

## 2025-08-19 ENCOUNTER — TELEPHONE (OUTPATIENT)
Dept: GASTROENTEROLOGY | Facility: CLINIC | Age: 55
End: 2025-08-19
Payer: MEDICARE